# Patient Record
Sex: FEMALE | Race: WHITE | NOT HISPANIC OR LATINO | Employment: FULL TIME | URBAN - METROPOLITAN AREA
[De-identification: names, ages, dates, MRNs, and addresses within clinical notes are randomized per-mention and may not be internally consistent; named-entity substitution may affect disease eponyms.]

---

## 2017-03-30 ENCOUNTER — HOSPITAL ENCOUNTER (EMERGENCY)
Facility: OTHER | Age: 37
Discharge: HOME | End: 2017-03-30
Attending: INTERNAL MEDICINE
Payer: MEDICAID

## 2017-03-30 VITALS
SYSTOLIC BLOOD PRESSURE: 138 MMHG | DIASTOLIC BLOOD PRESSURE: 86 MMHG | HEART RATE: 78 BPM | TEMPERATURE: 98 F | BODY MASS INDEX: 33.75 KG/M2 | HEIGHT: 66 IN | OXYGEN SATURATION: 98 % | RESPIRATION RATE: 18 BRPM | WEIGHT: 210 LBS

## 2017-03-30 DIAGNOSIS — L03.221 CELLULITIS OF NECK: Primary | ICD-10-CM

## 2017-03-30 PROCEDURE — 99283 EMERGENCY DEPT VISIT LOW MDM: CPT

## 2017-03-30 RX ORDER — IBUPROFEN 800 MG/1
800 TABLET ORAL EVERY 8 HOURS PRN
Qty: 20 TABLET | Refills: 0 | OUTPATIENT
Start: 2017-03-30 | End: 2018-08-16

## 2017-03-30 RX ORDER — DOXYCYCLINE 100 MG/1
100 CAPSULE ORAL 2 TIMES DAILY
Qty: 20 CAPSULE | Refills: 0 | Status: SHIPPED | OUTPATIENT
Start: 2017-03-30 | End: 2017-04-09

## 2017-03-30 NOTE — DISCHARGE INSTRUCTIONS
Discharge Instructions for Cellulitis  You have been diagnosed with cellulitis. This is an infection in the deepest layer of the skin. In some cases, the infection also affects the muscle. Cellulitis is caused by bacteria. The bacteria can enter the body through broken skin. This can happen with a cut, scratch, animal bite, or an insect bite that has been scratched. You may have been treated in the hospital with antibiotics and fluids. You will likely be given a prescription for antibiotics to take at home. This sheet will help you take care of yourself at home.  Home care  When you are home:  · Take the prescribed antibiotic medicine you are given as directed until it is gone. Take it even if you feel better. It treats the infection and stops it from returning. Not taking all the medicine can make future infections hard to treat.  · Keep the infected area clean.  · When possible, raise the infected area above the level of your heart. This helps keep swelling down.  · Talk with your healthcare provider if you are in pain. Ask what kind of over-the-counter medicine you can take for pain.  · Apply clean bandages as advised.  · Take your temperature once a day for a week.  · Wash your hands often to prevent spreading the infection.  In the future, wash your hands before and after you touch cuts, scratches, or bandages. This will help prevent infection.   When to call your healthcare provider  Call your healthcare provider immediately if you have any of the following:  · Difficulty or pain when moving the joints above or below the infected area  · Discharge or pus draining from the area  · Fever of 100.4°F (38°C) or higher, or as directed by your healthcare provider  · Pain that gets worse in or around the infected   · Redness that gets worse in or around the infected area, particularly if the area of redness expands to a wider area  · Shaking chills  · Swelling of the infected area  · Vomiting   Date Last Reviewed:  8/1/2016  © 2938-2164 [employer]. [address] [place] [address], [place] [address]. All rights reserved. This information is not intended as a substitute for professional medical care. Always follow your healthcare professional's instructions.

## 2017-03-30 NOTE — ED PROVIDER NOTES
Encounter Date: 3/30/2017       History     Chief Complaint   Patient presents with    Abscess     pt here with c/o swelling/redness of left side of neck     Review of patient's allergies indicates:  No Known Allergies  Patient is a 36 y.o. female presenting with the following complaint: rash. The history is provided by the patient. No  was used.   Rash    This is a new problem. The current episode started just prior to arrival. The problem has been unchanged. The problem is associated with an insect bite/sting. The rash is present on the neck (left). The pain is at a severity of 0/10. Associated symptoms include itching and pain. She has tried nothing for the symptoms.     History reviewed. No pertinent past medical history.  Past Surgical History:   Procedure Laterality Date     SECTION      x2    TUBAL LIGATION       Family History   Problem Relation Age of Onset    No Known Problems Mother     Hypertension Father     No Known Problems Sister     No Known Problems Brother      Social History   Substance Use Topics    Smoking status: Former Smoker     Types: Cigarettes    Smokeless tobacco: Never Used    Alcohol use Yes      Comment: occassionally     Review of Systems   Constitutional: Negative.    HENT: Negative.    Eyes: Negative.    Respiratory: Negative.    Cardiovascular: Negative.    Gastrointestinal: Negative.    Musculoskeletal: Negative.    Skin: Positive for color change, itching and rash. Negative for pallor and wound.   Neurological: Negative.    Hematological: Negative.    Psychiatric/Behavioral: Negative.        Physical Exam   Initial Vitals   BP Pulse Resp Temp SpO2   17 1451 17 1451 17 1451 17 1451 17 1451   138/86 78 18 97.7 °F (36.5 °C) 98 %     Physical Exam    Nursing note and vitals reviewed.  Constitutional: She appears well-developed and well-nourished.   HENT:   Head: Normocephalic.   Eyes: EOM are normal.   Neck: Normal  range of motion.   Cardiovascular: Normal rate and regular rhythm.   Pulmonary/Chest: Breath sounds normal.   Abdominal: Soft. Bowel sounds are normal.   Musculoskeletal: Normal range of motion.   Neurological: She is alert and oriented to person, place, and time.   Skin: Skin is warm and dry. No abscess noted. There is erythema. No pallor.   approx 0.5 cm raised area of induration surrounded by erythema for total of approx 3cm diameter area of erythema; slight tenderness to palpation; non-fluctuant         ED Course   Procedures  Labs Reviewed - No data to display                            ED Course     Labs Reviewed  No results found for any previous visit.     Imaging Reviewed    Imaging Results     None          Medications given in ED    Medications - No data to display    Discharge Medications     Medication List with Changes/Refills   New Medications    DOXYCYCLINE (VIBRAMYCIN) 100 MG CAP    Take 1 capsule (100 mg total) by mouth 2 (two) times daily.    IBUPROFEN (ADVIL,MOTRIN) 800 MG TABLET    Take 1 tablet (800 mg total) by mouth every 8 (eight) hours as needed for Pain.   Current Medications    BUPROPION (WELLBUTRIN XL) 150 MG TB24 TABLET    Take 150 mg by mouth once daily.             Patient discharged to home in stable condition with instructions to:   1. Please take all meds as prescribed.  2. Follow-up with your primary care doctor   3. Return precautions discussed and patient and/or family/caretaker understands to return to the emergency room for any concerns including worsening of your current symptoms, fever, chills, night sweats, worsening pain, chest pain, shortness of breath, nausea, vomiting, diarrhea, bleeding, headache, difficulty talking, visual disturbances, weakness, numbness or any other acute concerns    Clinical Impression:   Cellulitis of neck  Disposition:   Disposition: Discharged  Condition: Stable       Tal Borjas MD  03/30/17 4988

## 2017-03-30 NOTE — ED AVS SNAPSHOT
[facility] EMERGENCY DEPARTMENT  [address] [place] [address]               Kwasi Arias   3/30/2017  2:50 PM   ED    Description:  Female : 1980   Department:  [facility] Emergency Department           Your Care was Coordinated By:     Provider Role From To    Tal Borjas MD Attending Provider 17 [id number] --      Reason for Visit     Abscess           Diagnoses this Visit        Comments    Cellulitis of neck    -  Primary       ED Disposition     ED Disposition Condition Comment    Discharge             To Do List           Follow-up Information     Follow up with [facility] In 1 day(s).       These Medications        Disp Refills Start End    doxycycline (VIBRAMYCIN) 100 MG Cap 20 capsule 0 3/30/2017 2017    Take 1 capsule (100 mg total) by mouth 2 (two) times daily. - Oral    ibuprofen (ADVIL,MOTRIN) 800 MG tablet 20 tablet 0 3/30/2017     Take 1 tablet (800 mg total) by mouth every 8 (eight) hours as needed for Pain. - Oral      [facility] On Call     [facility] On Call Nurse Care Line -  Assistance  Unless otherwise directed by your provider, please contact [facility] On-Call, our nurse care line that is available for  assistance.     Registered nurses in the [facility] On Call Center provide: appointment scheduling, clinical advisement, health education, and other advisory services.  Call: [phone] (toll free)               Medications           Message regarding Medications     Verify the changes and/or additions to your medication regime listed below are the same as discussed with your clinician today.  If any of these changes or additions are incorrect, please notify your healthcare provider.        START taking these NEW medications        Refills    doxycycline (VIBRAMYCIN) 100 MG Cap 0    Sig: Take 1 capsule (100 mg total) by mouth 2 (two) times daily.    Class: Print    Route: Oral    ibuprofen (ADVIL,MOTRIN) 800 MG  "tablet 0    Sig: Take 1 tablet (800 mg total) by mouth every 8 (eight) hours as needed for Pain.    Class: Print    Route: Oral           Verify that the below list of medications is an accurate representation of the medications you are currently taking.  If none reported, the list may be blank. If incorrect, please contact your healthcare provider. Carry this list with you in case of emergency.           Current Medications     buPROPion (WELLBUTRIN XL) 150 MG TB24 tablet Take 150 mg by mouth once daily.    doxycycline (VIBRAMYCIN) 100 MG Cap Take 1 capsule (100 mg total) by mouth 2 (two) times daily.    ibuprofen (ADVIL,MOTRIN) 800 MG tablet Take 1 tablet (800 mg total) by mouth every 8 (eight) hours as needed for Pain.           Clinical Reference Information           Your Vitals Were     BP Pulse Temp Resp Height Weight    138/86 78 97.7 °F (36.5 °C) (Temporal) 18 5' 6" (1.676 m) 95.3 kg (210 lb)    Last Period SpO2 BMI          03/29/2017 98% 33.89 kg/m2        Allergies as of 3/30/2017     No Known Allergies      Immunizations Administered on Date of Encounter - 3/30/2017     None      ED Micro, Lab, POCT     None      ED Imaging Orders     None        Discharge Instructions           Discharge Instructions for Cellulitis  You have been diagnosed with cellulitis. This is an infection in the deepest layer of the skin. In some cases, the infection also affects the muscle. Cellulitis is caused by bacteria. The bacteria can enter the body through broken skin. This can happen with a cut, scratch, animal bite, or an insect bite that has been scratched. You may have been treated in the hospital with antibiotics and fluids. You will likely be given a prescription for antibiotics to take at home. This sheet will help you take care of yourself at home.  Home care  When you are home:  · Take the prescribed antibiotic medicine you are given as directed until it is gone. Take it even if you feel better. It treats the " infection and stops it from returning. Not taking all the medicine can make future infections hard to treat.  · Keep the infected area clean.  · When possible, raise the infected area above the level of your heart. This helps keep swelling down.  · Talk with your healthcare provider if you are in pain. Ask what kind of over-the-counter medicine you can take for pain.  · Apply clean bandages as advised.  · Take your temperature once a day for a week.  · Wash your hands often to prevent spreading the infection.  In the future, wash your hands before and after you touch cuts, scratches, or bandages. This will help prevent infection.   When to call your healthcare provider  Call your healthcare provider immediately if you have any of the following:  · Difficulty or pain when moving the joints above or below the infected area  · Discharge or pus draining from the area  · Fever of 100.4°F (38°C) or higher, or as directed by your healthcare provider  · Pain that gets worse in or around the infected   · Redness that gets worse in or around the infected area, particularly if the area of redness expands to a wider area  · Shaking chills  · Swelling of the infected area  · Vomiting   Date Last Reviewed: 8/1/2016  © 9872-4459 [employer]. [address] [place] [address]. All rights reserved. This information is not intended as a substitute for professional medical care. Always follow your healthcare professional's instructions.          [facility] Sign-Up     Activating your [facility] account is as easy as 1-2-3!     1) Visit [email or web address], select Sign Up Now, enter this activation code and your date of birth, then select Next.  [email or web address]  Expires: 5/14/2017  3:02 PM      2) Create a username and password to use when you visit [facility] in the future and select a security question in case you lose your password and select Next.    3) Enter your e-mail address and click Sign Up!    Additional  Information  If you have questions, please e-mail [email or web address] or call [phone] to talk to our [employer] staff. Remember, [facility] is NOT to be used for urgent needs. For medical emergencies, dial 911.         Smoking Cessation     If you would like to quit smoking:   You may be eligible for free services if you are a [place] resident and started smoking cigarettes before September 1, 1988.  Call the Smoking Cessation Trust ([facility]) toll free at (411) 651-6258 or ([phone] [phone].   Call 1-800-QUIT-NOW if you do not meet the above criteria.   Contact us via email: [email or web address]   View our website for more information: [email or web address]         [facility] complies with applicable Federal civil rights laws and does not discriminate on the basis of race, color, national origin, age, disability, or sex.        Language Assistance Services     ATTENTION: Language assistance services are available, free of charge. Please call [phone].      ATENCIÓN: Si habla español, tiene a troncoso disposición servicios gratuitos de asistencia lingüística. Llame al [phone].     CHÚ Ý: N?u b?n nói Ti?ng Vi?t, có các d?ch v? h? tr? ngôn ng? mi?n phí dành cho b?n. G?i s? [phone].

## 2017-12-23 ENCOUNTER — HOSPITAL ENCOUNTER (EMERGENCY)
Facility: OTHER | Age: 37
Discharge: HOME | End: 2017-12-23
Attending: EMERGENCY MEDICINE
Payer: MEDICAID

## 2017-12-23 VITALS
HEIGHT: 66 IN | HEART RATE: 91 BPM | TEMPERATURE: 98 F | SYSTOLIC BLOOD PRESSURE: 134 MMHG | RESPIRATION RATE: 16 BRPM | DIASTOLIC BLOOD PRESSURE: 74 MMHG | WEIGHT: 200 LBS | BODY MASS INDEX: 32.14 KG/M2 | OXYGEN SATURATION: 100 %

## 2017-12-23 DIAGNOSIS — J40 BRONCHITIS: Primary | ICD-10-CM

## 2017-12-23 DIAGNOSIS — F17.200 TOBACCO DEPENDENCE: ICD-10-CM

## 2017-12-23 LAB
B-HCG UR QL: NEGATIVE
CTP QC/QA: YES

## 2017-12-23 PROCEDURE — 81025 URINE PREGNANCY TEST: CPT | Performed by: EMERGENCY MEDICINE

## 2017-12-23 PROCEDURE — 99283 EMERGENCY DEPT VISIT LOW MDM: CPT

## 2017-12-23 RX ORDER — BENZONATATE 100 MG/1
100 CAPSULE ORAL 3 TIMES DAILY PRN
Qty: 20 CAPSULE | Refills: 0 | Status: SHIPPED | OUTPATIENT
Start: 2017-12-23 | End: 2018-01-02

## 2017-12-23 RX ORDER — AZITHROMYCIN 250 MG/1
TABLET, FILM COATED ORAL
Qty: 6 TABLET | Refills: 0 | Status: ON HOLD | OUTPATIENT
Start: 2017-12-23 | End: 2019-08-12 | Stop reason: HOSPADM

## 2017-12-23 NOTE — ED PROVIDER NOTES
Encounter Date: 2017       History     Chief Complaint   Patient presents with    Cough     non-productive cough x2 weeks     Chief complaint: Cough  37-year-old smoker presents with a cough for the last 2 weeks.  She has occasional green sputum production.  Patient's been taking Tylenol cold and flu without improvement.  She has shortness of breath occasionally.  She denies other symptoms such as chest pain, fever, sore throat or nasal congestion.      The history is provided by the patient.     Review of patient's allergies indicates:  No Known Allergies  History reviewed. No pertinent past medical history.  Past Surgical History:   Procedure Laterality Date     SECTION      x2    TUBAL LIGATION       Family History   Problem Relation Age of Onset    No Known Problems Mother     Hypertension Father     No Known Problems Sister     No Known Problems Brother      Social History   Substance Use Topics    Smoking status: Current Every Day Smoker     Types: Cigarettes    Smokeless tobacco: Never Used    Alcohol use Yes      Comment: occassionally     Review of Systems   Constitutional: Negative for fever.   HENT: Negative for congestion and sore throat.    Eyes: Negative for redness.   Respiratory: Positive for cough and shortness of breath (occasional).    Cardiovascular: Negative for chest pain and leg swelling.   Gastrointestinal: Negative for nausea.   Genitourinary: Negative for dysuria.   Musculoskeletal: Negative for back pain.   Skin: Negative for rash.   Neurological: Negative for weakness.       Physical Exam     Initial Vitals [17 1202]   BP Pulse Resp Temp SpO2   134/74 91 16 98.2 °F (36.8 °C) 100 %      MAP       94         Physical Exam    Nursing note and vitals reviewed.  Constitutional: She appears well-developed and well-nourished.   HENT:   Head: Normocephalic and atraumatic.   Eyes: Conjunctivae and EOM are normal. Pupils are equal, round, and reactive to light.   Neck:  Normal range of motion. Neck supple.   Cardiovascular: Normal rate and regular rhythm.   Pulmonary/Chest: Breath sounds normal.   Abdominal: Soft. There is no tenderness. There is no rebound and no guarding.   Musculoskeletal: Normal range of motion.   Neurological: She is alert and oriented to person, place, and time. She has normal strength.   Skin: Skin is warm and dry.   Psychiatric: She has a normal mood and affect.         ED Course   Procedures  Labs Reviewed   POCT URINE PREGNANCY             Medical Decision Making:   Initial Assessment:   37-year-old who presents with cough for the last 2 weeks.  On exam patient is afebrile.  Lungs are clear and oxygen saturation is 100%.  ED Management:  Patient will be referred to a tobacco treatment program.  She'll be started on azithromycin and Tessalon Perles.                   ED Course      Clinical Impression:   The primary encounter diagnosis was Bronchitis. A diagnosis of Tobacco dependence was also pertinent to this visit.                           Clarice Galindo MD  12/23/17 6001

## 2018-01-02 ENCOUNTER — HOSPITAL ENCOUNTER (EMERGENCY)
Facility: OTHER | Age: 38
Discharge: HOME | End: 2018-01-02
Attending: EMERGENCY MEDICINE
Payer: MEDICAID

## 2018-01-02 VITALS
TEMPERATURE: 98 F | BODY MASS INDEX: 32.62 KG/M2 | OXYGEN SATURATION: 98 % | RESPIRATION RATE: 18 BRPM | DIASTOLIC BLOOD PRESSURE: 62 MMHG | HEIGHT: 66 IN | SYSTOLIC BLOOD PRESSURE: 164 MMHG | HEART RATE: 86 BPM | WEIGHT: 203 LBS

## 2018-01-02 DIAGNOSIS — J02.9 PHARYNGITIS, UNSPECIFIED ETIOLOGY: Primary | ICD-10-CM

## 2018-01-02 LAB
B-HCG UR QL: NEGATIVE
CTP QC/QA: YES
CTP QC/QA: YES
S PYO RRNA THROAT QL PROBE: NEGATIVE

## 2018-01-02 PROCEDURE — 63600175 PHARM REV CODE 636 W HCPCS: Performed by: EMERGENCY MEDICINE

## 2018-01-02 PROCEDURE — 81025 URINE PREGNANCY TEST: CPT | Performed by: EMERGENCY MEDICINE

## 2018-01-02 PROCEDURE — 87880 STREP A ASSAY W/OPTIC: CPT

## 2018-01-02 PROCEDURE — 99283 EMERGENCY DEPT VISIT LOW MDM: CPT | Mod: 25

## 2018-01-02 RX ORDER — IBUPROFEN 800 MG/1
800 TABLET ORAL EVERY 6 HOURS PRN
Qty: 20 TABLET | Refills: 0 | OUTPATIENT
Start: 2018-01-02 | End: 2018-08-16

## 2018-01-02 RX ORDER — DEXAMETHASONE SODIUM PHOSPHATE 4 MG/ML
8 INJECTION, SOLUTION INTRA-ARTICULAR; INTRALESIONAL; INTRAMUSCULAR; INTRAVENOUS; SOFT TISSUE
Status: COMPLETED | OUTPATIENT
Start: 2018-01-02 | End: 2018-01-02

## 2018-01-02 RX ADMIN — DEXAMETHASONE SODIUM PHOSPHATE 8 MG: 4 INJECTION, SOLUTION INTRAMUSCULAR; INTRAVENOUS at 03:01

## 2018-01-02 NOTE — ED PROVIDER NOTES
"Encounter Date: 2018       History     Chief Complaint   Patient presents with    Sore Throat     Pt states, " My throat has been hurting since yesterday, I think I have strep."     Chief complaint: I think I have strep throat  37-year-old complains of a sore throat for the last 2 days.  Patient says that she gets strep throat 2-3 times a year.  She reports difficulty swallowing.  She denies any other complaints.  No fever or URI symptoms.  Patient was seen here on  and treated with antibiotics for bronchitis.  She said those symptoms have resolved        The history is provided by the patient and medical records.     Review of patient's allergies indicates:  No Known Allergies  No past medical history on file.  Past Surgical History:   Procedure Laterality Date     SECTION      x2    TUBAL LIGATION       Family History   Problem Relation Age of Onset    No Known Problems Mother     Hypertension Father     No Known Problems Sister     No Known Problems Brother      Social History   Substance Use Topics    Smoking status: Current Every Day Smoker     Types: Cigarettes    Smokeless tobacco: Never Used    Alcohol use Yes      Comment: occassionally     Review of Systems   Constitutional: Negative for fever.   HENT: Positive for sore throat and trouble swallowing.    Respiratory: Negative for shortness of breath.    Gastrointestinal: Negative for abdominal pain.   Musculoskeletal: Negative for neck stiffness.   Neurological: Negative for weakness and headaches.       Physical Exam     Initial Vitals [18 1428]   BP Pulse Resp Temp SpO2   (!) 164/62 85 16 98.1 °F (36.7 °C) 100 %      MAP       96         Physical Exam    Nursing note and vitals reviewed.  Constitutional: No distress.   HENT:   Head: Atraumatic.   Right Ear: Tympanic membrane normal.   Left Ear: Tympanic membrane normal.   Mouth/Throat: Uvula is midline and oropharynx is clear and moist. No oropharyngeal exudate, " posterior oropharyngeal edema or posterior oropharyngeal erythema.   Eyes: Conjunctivae are normal.   Cardiovascular: Normal rate.   Pulmonary/Chest: Breath sounds normal.   Neurological: She is alert and oriented to person, place, and time.   Skin: Skin is warm and dry.   Psychiatric: She has a normal mood and affect.         ED Course   Procedures  Labs Reviewed   POCT URINE PREGNANCY   POCT RAPID STREP A             Medical Decision Making:   Initial Assessment:   37-year-old who complains of sore throat.  On exam patient is in no acute distress.  She does not have exudate to her posterior pharynx.  Neck is supple  ED Management:  Patient was negative for strep.  She was given Decadron in the ED and will be discharged on ibuprofen.                   ED Course      Clinical Impression:   The encounter diagnosis was Pharyngitis, unspecified etiology.                           Clarice Galindo MD  01/02/18 [id number]

## 2018-08-16 ENCOUNTER — HOSPITAL ENCOUNTER (EMERGENCY)
Facility: HOSPITAL | Age: 38
Discharge: HOME | End: 2018-08-16
Attending: EMERGENCY MEDICINE
Payer: MEDICAID

## 2018-08-16 VITALS
SYSTOLIC BLOOD PRESSURE: 136 MMHG | DIASTOLIC BLOOD PRESSURE: 81 MMHG | HEIGHT: 66 IN | BODY MASS INDEX: 31.34 KG/M2 | RESPIRATION RATE: 18 BRPM | TEMPERATURE: 100 F | WEIGHT: 195 LBS | OXYGEN SATURATION: 99 % | HEART RATE: 96 BPM

## 2018-08-16 DIAGNOSIS — J06.9 UPPER RESPIRATORY TRACT INFECTION, UNSPECIFIED TYPE: ICD-10-CM

## 2018-08-16 DIAGNOSIS — J02.0 STREP PHARYNGITIS: Primary | ICD-10-CM

## 2018-08-16 LAB
B-HCG UR QL: NEGATIVE
CTP QC/QA: YES
CTP QC/QA: YES
S PYO RRNA THROAT QL PROBE: POSITIVE

## 2018-08-16 PROCEDURE — 96372 THER/PROPH/DIAG INJ SC/IM: CPT

## 2018-08-16 PROCEDURE — 81025 URINE PREGNANCY TEST: CPT | Performed by: EMERGENCY MEDICINE

## 2018-08-16 PROCEDURE — 99283 EMERGENCY DEPT VISIT LOW MDM: CPT | Mod: 25

## 2018-08-16 PROCEDURE — 63600175 PHARM REV CODE 636 W HCPCS: Performed by: NURSE PRACTITIONER

## 2018-08-16 PROCEDURE — 87880 STREP A ASSAY W/OPTIC: CPT

## 2018-08-16 RX ORDER — BENZONATATE 100 MG/1
100 CAPSULE ORAL 3 TIMES DAILY PRN
Qty: 30 CAPSULE | Refills: 0 | Status: SHIPPED | OUTPATIENT
Start: 2018-08-16 | End: 2018-08-26

## 2018-08-16 RX ORDER — IBUPROFEN 600 MG/1
600 TABLET ORAL EVERY 6 HOURS PRN
Qty: 20 TABLET | Refills: 0 | Status: SHIPPED | OUTPATIENT
Start: 2018-08-16 | End: 2018-11-27 | Stop reason: SDUPTHER

## 2018-08-16 RX ORDER — DEXAMETHASONE SODIUM PHOSPHATE 4 MG/ML
10 INJECTION, SOLUTION INTRA-ARTICULAR; INTRALESIONAL; INTRAMUSCULAR; INTRAVENOUS; SOFT TISSUE
Status: COMPLETED | OUTPATIENT
Start: 2018-08-16 | End: 2018-08-16

## 2018-08-16 RX ADMIN — PENICILLIN G BENZATHINE 1.2 MILLION UNITS: 1200000 INJECTION, SUSPENSION INTRAMUSCULAR at 03:08

## 2018-08-16 RX ADMIN — DEXAMETHASONE SODIUM PHOSPHATE 10 MG: 4 INJECTION, SOLUTION INTRAMUSCULAR; INTRAVENOUS at 03:08

## 2018-08-16 NOTE — ED NOTES
Neuro: AAOx3  HEENT: Pt reports sore throat that began last night. Pt speaks with hoarseness. Throat appears reddened.   Resp: Airway patent, respirations even/unlabored. No SOB noted.  Cardiac: Skin pink/warm/dry, pulses intact. Pt denies any chest pain  Abdomen: Soft, non-tender to palpation, denies N/V/D  : No signs or symptoms of urinary disturbances  Musculoskeletal: Moves all extremities equally, ROM intact  Skin: Skin is dry and intact. Reports chills.

## 2018-08-16 NOTE — ED PROVIDER NOTES
Encounter Date: 2018       History     Chief Complaint   Patient presents with    Sore Throat     sore throat x's 1 week, painful swallowing     A 37-year-old female who presents to the ED with complaints of a sore throat x1 week.  She reports nasal congestion and cough.  Patient reports pain with swallowing.  Patient denies fever or chills. Patient has no significant medical problems but states she gets strep every year.      The history is provided by the patient.   Sore Throat    This is a new problem. The current episode started several weeks ago. The problem has been gradually worsening. There has been no fever. Associated symptoms include congestion and coughing. Pertinent negatives include no abdominal pain, neck pain, shortness of breath or vomiting. The treatment provided no relief.     Review of patient's allergies indicates:  No Known Allergies  History reviewed. No pertinent past medical history.  Past Surgical History:   Procedure Laterality Date     SECTION      x2    TUBAL LIGATION       Family History   Problem Relation Age of Onset    No Known Problems Mother     Hypertension Father     No Known Problems Sister     No Known Problems Brother      Social History     Tobacco Use    Smoking status: Current Every Day Smoker     Types: Cigarettes    Smokeless tobacco: Never Used   Substance Use Topics    Alcohol use: Yes     Comment: occassionally    Drug use: No     Review of Systems   Constitutional: Negative.  Negative for chills and fever.   HENT: Positive for congestion and sore throat.    Eyes: Negative.    Respiratory: Positive for cough. Negative for chest tightness and shortness of breath.    Cardiovascular: Negative.  Negative for chest pain.   Gastrointestinal: Negative.  Negative for abdominal pain and vomiting.   Endocrine: Negative.    Genitourinary: Negative.  Negative for dysuria and hematuria.   Musculoskeletal: Negative.  Negative for back pain and neck pain.    Skin: Negative.  Negative for rash.   Allergic/Immunologic: Negative for immunocompromised state.   Neurological: Negative.  Negative for weakness.   Hematological: Does not bruise/bleed easily.   Psychiatric/Behavioral: Negative.    All other systems reviewed and are negative.      Physical Exam     Initial Vitals [08/16/18 1502]   BP Pulse Resp Temp SpO2   136/81 96 18 99.6 °F (37.6 °C) 99 %      MAP       --         Physical Exam    Nursing note and vitals reviewed.  Constitutional: Vital signs are normal. She appears well-developed. She is cooperative. She does not appear ill.   HENT:   Head: Normocephalic and atraumatic.   Right Ear: External ear normal.   Left Ear: External ear normal.   Nose: Nose normal.   Mouth/Throat: Uvula is midline and mucous membranes are normal. Posterior oropharyngeal erythema present.   Eyes: Conjunctivae and lids are normal. Pupils are equal, round, and reactive to light.   Neck: Normal range of motion. Neck supple.   Cardiovascular: Normal rate, regular rhythm, S1 normal, S2 normal and normal heart sounds.   Pulmonary/Chest: Effort normal and breath sounds normal.   Abdominal: Soft. Normal appearance and bowel sounds are normal. There is no tenderness.   Musculoskeletal: Normal range of motion.   From of all extremities   Lymphadenopathy:     She has cervical adenopathy.        Right cervical: Superficial cervical adenopathy present.        Left cervical: Superficial cervical adenopathy present.   Neurological: She is alert and oriented to person, place, and time.   Skin: Skin is warm, dry and intact.   Psychiatric: She has a normal mood and affect. Her speech is normal. Thought content normal. Cognition and memory are normal.         ED Course   Procedures  Labs Reviewed   POCT RAPID STREP A - Abnormal; Notable for the following components:       Result Value    Rapid Strep A Screen Positive (*)     All other components within normal limits   POCT URINE PREGNANCY           Imaging Results    None          Medical Decision Making:   Initial Assessment:   A 37-year-old female who presents to the ED with complaints of the sore throat for 1 week.  Patient denies fever or chills.  Patient reports nasal congestion and cough.  The  Differential Diagnosis:   Viral pharyngitis  Strep pharyngitis  Clinical Tests:   Lab Tests: Ordered and Reviewed  ED Management:  Physical exam.  Straps swab positive.  Bicillin LA 1.2 IM.  Decadron 10 mg IM.   Discharge home with Tessalon Perles and Motrin p.r.n..  Follow-up with PCP in 1-2 days.  Return ED for worsening of symptoms.                      Clinical Impression:   The primary encounter diagnosis was Strep pharyngitis. A diagnosis of Upper respiratory tract infection, unspecified type was also pertinent to this visit.                             Viviane Barlow, LUIS ARMANDO  08/16/18 1536

## 2018-11-27 ENCOUNTER — HOSPITAL ENCOUNTER (EMERGENCY)
Facility: HOSPITAL | Age: 38
Discharge: HOME | End: 2018-11-27
Attending: EMERGENCY MEDICINE
Payer: MEDICAID

## 2018-11-27 VITALS
HEART RATE: 89 BPM | RESPIRATION RATE: 19 BRPM | BODY MASS INDEX: 31.66 KG/M2 | SYSTOLIC BLOOD PRESSURE: 148 MMHG | DIASTOLIC BLOOD PRESSURE: 85 MMHG | OXYGEN SATURATION: 100 % | TEMPERATURE: 98 F | HEIGHT: 66 IN | WEIGHT: 197 LBS

## 2018-11-27 DIAGNOSIS — M65.4 DE QUERVAIN'S TENOSYNOVITIS, LEFT: Primary | ICD-10-CM

## 2018-11-27 DIAGNOSIS — M25.532 LEFT WRIST PAIN: ICD-10-CM

## 2018-11-27 LAB
B-HCG UR QL: NEGATIVE
CTP QC/QA: YES

## 2018-11-27 PROCEDURE — 81025 URINE PREGNANCY TEST: CPT | Performed by: EMERGENCY MEDICINE

## 2018-11-27 PROCEDURE — 99283 EMERGENCY DEPT VISIT LOW MDM: CPT | Mod: 25

## 2018-11-27 PROCEDURE — 29125 APPL SHORT ARM SPLINT STATIC: CPT | Mod: LT

## 2018-11-27 RX ORDER — IBUPROFEN 600 MG/1
600 TABLET ORAL EVERY 6 HOURS PRN
Qty: 20 TABLET | Refills: 0 | Status: ON HOLD | OUTPATIENT
Start: 2018-11-27 | End: 2019-08-12 | Stop reason: HOSPADM

## 2018-11-27 NOTE — ED PROVIDER NOTES
Encounter Date: 2018       History     Chief Complaint   Patient presents with    Wrist Pain     pt reports she injured her left wrist about 2 months ago and she had been wearing a brace that she brought from a store. Pt reports last week she was at work, when she heard a crack in her wrist and has been having pain on and off since. pt reports she has been taking ibuprofen for pain but has had no relief     The history is provided by the patient. No  was used.   Hand Injury    Illness onset: Several weeks ago. The incident occurred in the street. The injury mechanism was a fall. Pain location: Left wrist. The pain is at a severity of 3/10. Pain course: Waxing and waning. Pertinent negatives include no fever. She reports no foreign bodies present. The symptoms are aggravated by movement. Treatments tried: Wrist brace.     Review of patient's allergies indicates:  No Known Allergies  History reviewed. No pertinent past medical history.  Past Surgical History:   Procedure Laterality Date     SECTION      x2    TUBAL LIGATION       Family History   Problem Relation Age of Onset    No Known Problems Mother     Hypertension Father     No Known Problems Sister     No Known Problems Brother      Social History     Tobacco Use    Smoking status: Current Every Day Smoker     Types: Cigarettes    Smokeless tobacco: Never Used   Substance Use Topics    Alcohol use: Yes     Comment: occassionally    Drug use: No     Review of Systems   Constitutional: Negative.  Negative for appetite change and fever.   HENT: Negative.  Negative for congestion, dental problem, ear discharge, hearing loss, mouth sores, rhinorrhea and trouble swallowing.    Eyes: Negative.  Negative for pain and discharge.   Respiratory: Negative.  Negative for shortness of breath.    Cardiovascular: Negative.  Negative for chest pain.   Gastrointestinal: Negative.  Negative for abdominal distention, abdominal pain,  constipation, diarrhea, nausea, rectal pain and vomiting.   Endocrine: Negative.    Genitourinary: Negative.  Negative for dyspareunia, dysuria, hematuria, vaginal bleeding, vaginal discharge and vaginal pain.   Musculoskeletal: Negative for back pain and neck pain.        Left wrist pain   Skin: Negative.  Negative for rash.   Allergic/Immunologic: Negative.    Neurological: Negative.  Negative for facial asymmetry, speech difficulty and light-headedness.   Hematological: Negative.    Psychiatric/Behavioral: Negative.  Negative for agitation, dysphoric mood and sleep disturbance.   All other systems reviewed and are negative.      Physical Exam     Initial Vitals [11/27/18 1507]   BP Pulse Resp Temp SpO2   (!) 148/85 89 19 98.3 °F (36.8 °C) 100 %      MAP       --         Physical Exam    Nursing note and vitals reviewed.  Constitutional: She appears well-developed and well-nourished. She is not diaphoretic.  Non-toxic appearance. She does not appear ill. No distress.   HENT:   Head: Normocephalic and atraumatic.   Eyes: Conjunctivae are normal. Right eye exhibits no discharge. Left eye exhibits no discharge.   Neck: Normal range of motion.   Cardiovascular: Normal rate, regular rhythm, normal heart sounds and intact distal pulses. Exam reveals no gallop and no friction rub.    No murmur heard.  Pulmonary/Chest: Breath sounds normal. No respiratory distress. She has no wheezes. She has no rhonchi. She has no rales. She exhibits no tenderness.   Musculoskeletal: Normal range of motion.        Left wrist: She exhibits tenderness and bony tenderness. She exhibits normal range of motion, no swelling, no effusion, no crepitus, no deformity and no laceration.   Negative scaphoid tenderness  Positive Finkelstein test   Neurological: She is alert and oriented to person, place, and time.   Skin: Skin is warm and dry. No rash noted.   Psychiatric: She has a normal mood and affect. Her behavior is normal. Judgment and thought  content normal.         ED Course   Splint Application  Date/Time: 11/27/2018 3:38 PM  Performed by: Toussaint Battley III, FNP  Authorized by: Laura Osorio DO   Consent Done: Emergent Situation  Location details: left wrist  Splint type: thumb spica  Supplies used: Velcro.  Post-procedure: The splinted body part was neurovascularly unchanged following the procedure.  Patient tolerance: Patient tolerated the procedure well with no immediate complications        Labs Reviewed   POCT URINE PREGNANCY          Imaging Results          X-Ray Wrist Complete Left (Final result)  Result time 11/27/18 15:30:17    Final result by Waldemar Lyn MD (11/27/18 15:30:17)                 Impression:      1. Focus of cortical irregularity, involving the distal radius as described above, may reflect irregularity related to physeal fusion although correlation with any focal tenderness in the region is advised.      Electronically signed by: Waldemar Lyn MD  Date:    11/27/2018  Time:    15:30             Narrative:    EXAMINATION:  XR WRIST COMPLETE 3 VIEWS LEFT    CLINICAL HISTORY:  Pain in left wrist    TECHNIQUE:  PA, lateral, and oblique views of the left wrist were performed.    COMPARISON:  None    FINDINGS:  Three views.    There is osseous irregularity along the thenar aspect of the distal radius, projecting somewhat posteriorly on lateral view.  This may reflect undulation related to irregular fusion of the physis although correlation with any focal tenderness in the region is recommended.  No dislocation..                                 Medical Decision Making:   Initial Assessment:   De Quervain's tenosynovitis  Differential Diagnosis:   Carpal tunnel, fracture/dislocation  Clinical Tests:   Radiological Study: Ordered and Reviewed  ED Management:  The patient is instructed to stop using a wrist brace and to begin using the thumb spica.  The patient will be discharged home with instructions to continue to take  over-the-counter NSAIDs, use wrist brace as needed, follow up with Dr. Mario Sprague, Orthopedic, for follow-up within 1 week due to irregularity noted on x-ray, and return to the ER as needed if symptoms worsen or fail to improve.  The patient verbalized understanding of discharge instructions and treatment plan.                      Clinical Impression:   The primary encounter diagnosis was De Quervain's tenosynovitis, left. A diagnosis of Left wrist pain was also pertinent to this visit.                             Toussaint Battley III, [facility]  11/27/18 1538

## 2018-11-28 ENCOUNTER — PES CALL (OUTPATIENT)
Dept: ADMINISTRATIVE | Facility: CLINIC | Age: 38
End: 2018-11-28

## 2019-05-31 ENCOUNTER — HOSPITAL ENCOUNTER (EMERGENCY)
Facility: HOSPITAL | Age: 39
Discharge: HOME | End: 2019-05-31
Attending: EMERGENCY MEDICINE
Payer: MEDICAID

## 2019-05-31 VITALS
SYSTOLIC BLOOD PRESSURE: 130 MMHG | DIASTOLIC BLOOD PRESSURE: 75 MMHG | BODY MASS INDEX: 31.34 KG/M2 | HEART RATE: 83 BPM | OXYGEN SATURATION: 99 % | RESPIRATION RATE: 16 BRPM | HEIGHT: 66 IN | WEIGHT: 195 LBS | TEMPERATURE: 98 F

## 2019-05-31 DIAGNOSIS — K04.7 DENTAL INFECTION: Primary | ICD-10-CM

## 2019-05-31 DIAGNOSIS — R22.0 LEFT FACIAL SWELLING: ICD-10-CM

## 2019-05-31 LAB
B-HCG UR QL: NEGATIVE
CTP QC/QA: YES

## 2019-05-31 PROCEDURE — 81025 URINE PREGNANCY TEST: CPT | Mod: ER | Performed by: EMERGENCY MEDICINE

## 2019-05-31 PROCEDURE — 25000003 PHARM REV CODE 250: Mod: ER | Performed by: NURSE PRACTITIONER

## 2019-05-31 PROCEDURE — 99284 EMERGENCY DEPT VISIT MOD MDM: CPT | Mod: 25,ER

## 2019-05-31 RX ORDER — AMOXICILLIN AND CLAVULANATE POTASSIUM 875; 125 MG/1; MG/1
1 TABLET, FILM COATED ORAL
Status: COMPLETED | OUTPATIENT
Start: 2019-05-31 | End: 2019-05-31

## 2019-05-31 RX ORDER — LORATADINE 10 MG/1
10 TABLET ORAL DAILY
Qty: 30 TABLET | Refills: 0 | Status: ON HOLD | OUTPATIENT
Start: 2019-05-31 | End: 2019-08-12 | Stop reason: HOSPADM

## 2019-05-31 RX ORDER — IBUPROFEN 600 MG/1
600 TABLET ORAL
Status: COMPLETED | OUTPATIENT
Start: 2019-05-31 | End: 2019-05-31

## 2019-05-31 RX ORDER — IBUPROFEN 600 MG/1
600 TABLET ORAL EVERY 6 HOURS PRN
Qty: 20 TABLET | Refills: 0 | Status: SHIPPED | OUTPATIENT
Start: 2019-05-31 | End: 2019-06-05

## 2019-05-31 RX ORDER — AMOXICILLIN AND CLAVULANATE POTASSIUM 875; 125 MG/1; MG/1
1 TABLET, FILM COATED ORAL 2 TIMES DAILY
Qty: 14 TABLET | Refills: 0 | Status: SHIPPED | OUTPATIENT
Start: 2019-05-31 | End: 2019-06-07

## 2019-05-31 RX ADMIN — AMOXICILLIN AND CLAVULANATE POTASSIUM 1 TABLET: 875; 125 TABLET, FILM COATED ORAL at 11:05

## 2019-05-31 RX ADMIN — IBUPROFEN 600 MG: 600 TABLET ORAL at 11:05

## 2019-05-31 NOTE — DISCHARGE INSTRUCTIONS
We have prescribed antibiotics for your facial swelling and dental pain.  We have also prescribed to ibuprofen and Claritin for sinus congestion.  Please follow up with your PCP for symptoms do not resolve in the next 7-10 days.    Our goal in the emergency department is to always give you outstanding care and exceptional service. You may receive a survey by mail or e-mail in the next week regarding your experience in our ED. We would greatly appreciate your completing and returning the survey. Your feedback provides us with a way to recognize our staff who give very good care and it helps us learn how to improve when your experience was below our aspiration of excellence.

## 2019-05-31 NOTE — ED PROVIDER NOTES
Encounter Date: 2019    SCRIBE #1 NOTE: I, Aliyah Carson, am scribing for, and in the presence of,  Lindsey Moss NP. I have scribed the following portions of the note - Other sections scribed: HPI, ROS, and PE.       History     Chief Complaint   Patient presents with    Facial Swelling     Swelling to left cheek upon waking this morning.  Pt took generic allergy/sinus medicine approx 2 hours PTA - reports some decreased in swelling after taking medicine.     Kwasi Arias is a 38 y.o. female who presents to the ED complaining of facial swelling that began this morning when she woke up. Pt endorses sinus pain. She states she took sinus medication 2.5 hours ago which somewhat alleviated symptoms. Pt states she chipped one of her teeth a couple months ago, but it has not been bothering her.    The history is provided by the patient. No  was used.     Review of patient's allergies indicates:  No Known Allergies  History reviewed. No pertinent past medical history.  Past Surgical History:   Procedure Laterality Date     SECTION      x2    TUBAL LIGATION       Family History   Problem Relation Age of Onset    No Known Problems Mother     Hypertension Father     No Known Problems Sister     No Known Problems Brother      Social History     Tobacco Use    Smoking status: Current Every Day Smoker     Types: Cigarettes    Smokeless tobacco: Never Used   Substance Use Topics    Alcohol use: Yes     Comment: occassionally    Drug use: No     Review of Systems   Constitutional: Negative for fever.   HENT: Positive for dental problem, facial swelling, postnasal drip, rhinorrhea and sinus pain. Negative for congestion, mouth sores, sinus pressure, sore throat, trouble swallowing and voice change.         Positive for chipped tooth.   Respiratory: Negative for shortness of breath.    Cardiovascular: Negative for chest pain.   Gastrointestinal: Negative for nausea.    Genitourinary: Negative for dysuria.   Musculoskeletal: Negative for back pain.   Skin: Negative for rash.   Neurological: Negative for weakness.   Hematological: Does not bruise/bleed easily.   All other systems reviewed and are negative.      Physical Exam     Initial Vitals [05/31/19 1123]   BP Pulse Resp Temp SpO2   130/75 83 16 98.3 °F (36.8 °C) 99 %      MAP       --         Physical Exam    Nursing note and vitals reviewed.  Constitutional: Vital signs are normal. She appears well-developed and well-nourished. She is cooperative. She does not have a sickly appearance. She does not appear ill. No distress.   HENT:   Head: Normocephalic and atraumatic. Head is without abrasion, without contusion and without laceration.       Nose: Mucosal edema present. No sinus tenderness, septal deviation or nasal septal hematoma.  No foreign bodies. Left sinus exhibits maxillary sinus tenderness.   Mouth/Throat: Uvula is midline, oropharynx is clear and moist and mucous membranes are normal.       Mild left facial swelling noted. Focal tenderness to palpation noted.   Eyes: Conjunctivae, EOM and lids are normal. Pupils are equal, round, and reactive to light.   Neck: Normal range of motion.   Cardiovascular: Normal rate, regular rhythm and normal heart sounds.   Pulmonary/Chest: Effort normal and breath sounds normal. No respiratory distress.   Musculoskeletal: Normal range of motion.   Neurological: She is alert and oriented to person, place, and time. GCS score is 15. GCS eye subscore is 4. GCS verbal subscore is 5. GCS motor subscore is 6.   Skin: Skin is warm, dry and intact. Capillary refill takes less than 2 seconds. No abrasion, no ecchymosis, no laceration and no rash noted. No erythema.         ED Course   Procedures  Labs Reviewed   POCT URINE PREGNANCY          Imaging Results    None          Medical Decision Making:   Initial Assessment:   Emergent evaluation of a 39 yo female patient presenting to the ER with  chief complaint of left facial swelling and tenderness this morning.  Patient states she took a sinus pill which resolved some of the tenderness and swelling. Patient denies any focal tooth pain, difficulty swallowing or difficulty breathing.  On exam patient is A&O x3. Patient is not febrile and nontoxic appearing.  Breath sounds clear bilaterally.  No stridor noted.  Tenderness to palpation to the left cheek.  Mild swelling noted. 1st molar has some redness at the root base.  No severe tenderness to palpation noted. No redness, erythema or warmth to face.  Mucous membranes pink and moist.  Oropharynx clear.    Differential Diagnosis:   Differential diagnoses include but are not limited to dental abscess, dental infection, sinusitis, cellulitis.      Clinical Tests:   Lab Tests: Ordered and Reviewed  The following lab test(s) were unremarkable: UPT       <> Summary of Lab: Urine preg negative.  ED Management:  I do not feel labs or imaging are pertinent for the care this patient.  I will medicate and discharge home.    Patient verbalized understanding of this plan of care.  All questions and concerns addressed.  Patient is hemodynamically stable, vital signs are normal. Discharge instructions given. Return to ED precautions discussed. Follow up as directed. Pt verbalized understanding of this plan.  Pt is stable for discharge.            Scribe Attestation:   Scribe #1: I performed the above scribed service and the documentation accurately describes the services I performed. I attest to the accuracy of the note.    I, Lindsey Moss, personally performed the services described in this documentation. All medical record entries made by the scribe were at my direction and in my presence.  I have reviewed the chart and agree that the record reflects my personal performance and is accurate and complete.     Lindsey Moss, SPENSERP, FNP    12:10 PM 05/31/2019             Clinical Impression:     1. Dental infection    2. Left  facial swelling            Disposition:   Disposition: Discharged  Condition: Stable                        Lindsey Moss NP  05/31/19 8578

## 2019-05-31 NOTE — ED NOTES
Patient stated she woke up this morning with left facial pain and left upper gum pain   Patient stated she cracked an upper left tooth a while back and hasn't been to dentist

## 2019-06-14 ENCOUNTER — HOSPITAL ENCOUNTER (EMERGENCY)
Facility: HOSPITAL | Age: 39
Discharge: HOME | End: 2019-06-14
Attending: EMERGENCY MEDICINE
Payer: MEDICAID

## 2019-06-14 VITALS
OXYGEN SATURATION: 99 % | SYSTOLIC BLOOD PRESSURE: 127 MMHG | HEART RATE: 88 BPM | TEMPERATURE: 98 F | RESPIRATION RATE: 16 BRPM | BODY MASS INDEX: 31.34 KG/M2 | WEIGHT: 195 LBS | DIASTOLIC BLOOD PRESSURE: 76 MMHG | HEIGHT: 66 IN

## 2019-06-14 DIAGNOSIS — K08.89 PAIN, DENTAL: Primary | ICD-10-CM

## 2019-06-14 LAB
B-HCG UR QL: NEGATIVE
CTP QC/QA: YES

## 2019-06-14 PROCEDURE — 81025 URINE PREGNANCY TEST: CPT | Mod: ER | Performed by: EMERGENCY MEDICINE

## 2019-06-14 PROCEDURE — 99283 EMERGENCY DEPT VISIT LOW MDM: CPT | Mod: ER

## 2019-06-14 RX ORDER — AMOXICILLIN AND CLAVULANATE POTASSIUM 875; 125 MG/1; MG/1
1 TABLET, FILM COATED ORAL 2 TIMES DAILY
Qty: 14 TABLET | Refills: 0 | Status: SHIPPED | OUTPATIENT
Start: 2019-06-14 | End: 2019-06-24

## 2019-06-14 NOTE — ED PROVIDER NOTES
Encounter Date: 2019    SCRIBE #1 NOTE: I, Hannah Jolly, am scribing for, and in the presence of,  Dr. Galindo. I have scribed the following portions of the note - Other sections scribed: HPI, ROS, PE.       History     Chief Complaint   Patient presents with    Dental Problem     Recurring dental abscess to left upper side of mouth.  Pt requesting additional antibiotics to last to her dental appointment on 19.     CC: dental pain  38 y.o female presents to the ED with left upper dental pain that feels like a pressure that started yesterday. She has a hx of a dental abscess and was treated with antibiotics but fears that the infection may have come back due to the intial infection starting out the same way. She has an appointment with a dentist on 19, but feels her symptoms may become worse before then. She denies fever, chills, or facial swelling.     The history is provided by the patient. No  was used.     Review of patient's allergies indicates:  No Known Allergies  History reviewed. No pertinent past medical history.  Past Surgical History:   Procedure Laterality Date     SECTION      x2    TUBAL LIGATION       Family History   Problem Relation Age of Onset    No Known Problems Mother     Hypertension Father     No Known Problems Sister     No Known Problems Brother      Social History     Tobacco Use    Smoking status: Current Every Day Smoker     Types: Cigarettes    Smokeless tobacco: Never Used   Substance Use Topics    Alcohol use: Yes     Comment: occassionally    Drug use: No     Review of Systems   Constitutional: Negative for chills and fever.   HENT: Positive for dental problem. Negative for facial swelling.        Physical Exam     Initial Vitals [19 1037]   BP Pulse Resp Temp SpO2   127/76 88 16 98.4 °F (36.9 °C) 99 %      MAP       --         Physical Exam    Nursing note and vitals reviewed.  Constitutional: She appears well-developed  and well-nourished. She is not diaphoretic. No distress.   HENT:   Head: Normocephalic and atraumatic.   Mouth/Throat: Uvula is midline, oropharynx is clear and moist and mucous membranes are normal. No oral lesions. No trismus in the jaw. No dental abscesses or uvula swelling. No oropharyngeal exudate, posterior oropharyngeal edema, posterior oropharyngeal erythema or tonsillar abscesses.       Eyes: EOM are normal.   Neck: Normal range of motion. Neck supple. Normal range of motion present.   Pulmonary/Chest: No respiratory distress.   Musculoskeletal: Normal range of motion.   Lymphadenopathy:        Head (right side): No submandibular adenopathy present.        Head (left side): No submandibular adenopathy present.   Neurological: She is alert and oriented to person, place, and time. No cranial nerve deficit or sensory deficit.   Skin: Skin is warm and dry. Capillary refill takes less than 2 seconds.   Psychiatric: She has a normal mood and affect. Her behavior is normal.         ED Course   Procedures  Labs Reviewed   POCT URINE PREGNANCY          Imaging Results    None          Medical Decision Making:   Initial Assessment:   38 y.o female presents to the ED with left upper dental pain since yesterday. Physical exam findings are significant for mild swelling above the 2nd left incisor. No abscess noted. No erythema to the gums. Oropharynx clear and moist. No trismus or drooling.   Clinical Tests:   Lab Tests: Ordered and Reviewed  ED Management:  I will order POCT UPT.  Patient will be given a prescription for Augmentin.            Scribe Attestation:   Scribe #1: I performed the above scribed service and the documentation accurately describes the services I performed. I attest to the accuracy of the note.    \   I, Dr. Clarice Galindo, personally performed the services described in this documentation. All medical record entries made by the scribe were at my direction and in my presence.  I have reviewed the  chart and agree that the record reflects my personal performance and is accurate and complete. Clarice Galindo MD.  11:28 AM 06/14/2019             Clinical Impression:     1. Pain, dental                                   Clarice Galindo MD  06/14/19 1122

## 2019-06-14 NOTE — PROGRESS NOTES
Pt requested diflucan after atb. Dr. persaud aware of the request. New order received. Prescription called in to the [facility] on [address] and [facility]. Pt was made aware.

## 2019-08-10 ENCOUNTER — HOSPITAL ENCOUNTER (INPATIENT)
Facility: HOSPITAL | Age: 39
LOS: 2 days | Discharge: HOME | DRG: 392 | End: 2019-08-12
Attending: INTERNAL MEDICINE | Admitting: INTERNAL MEDICINE
Payer: MEDICAID

## 2019-08-10 DIAGNOSIS — K57.92 ACUTE DIVERTICULITIS: ICD-10-CM

## 2019-08-10 DIAGNOSIS — K57.92 ACUTE DIVERTICULITIS: Primary | ICD-10-CM

## 2019-08-10 LAB
B-HCG UR QL: NEGATIVE
BILIRUBIN, POC UA: NEGATIVE
BLOOD, POC UA: NEGATIVE
CLARITY, POC UA: CLEAR
COLOR, POC UA: YELLOW
CTP QC/QA: YES
GLUCOSE, POC UA: NEGATIVE
KETONES, POC UA: NEGATIVE
LEUKOCYTE EST, POC UA: NEGATIVE
NITRITE, POC UA: NEGATIVE
PH UR STRIP: 6 [PH]
PROTEIN, POC UA: NEGATIVE
SPECIFIC GRAVITY, POC UA: 1.02
UROBILINOGEN, POC UA: 0.2 E.U./DL

## 2019-08-10 PROCEDURE — 99285 EMERGENCY DEPT VISIT HI MDM: CPT | Mod: 25,ER

## 2019-08-10 PROCEDURE — 81003 URINALYSIS AUTO W/O SCOPE: CPT | Mod: ER

## 2019-08-10 PROCEDURE — 81025 URINE PREGNANCY TEST: CPT | Mod: ER | Performed by: INTERNAL MEDICINE

## 2019-08-10 PROCEDURE — 21400001 HC TELEMETRY ROOM

## 2019-08-10 PROCEDURE — 25500020 PHARM REV CODE 255: Mod: ER | Performed by: INTERNAL MEDICINE

## 2019-08-10 PROCEDURE — 85025 COMPLETE CBC W/AUTO DIFF WBC: CPT | Mod: ER

## 2019-08-10 PROCEDURE — 96374 THER/PROPH/DIAG INJ IV PUSH: CPT | Mod: ER

## 2019-08-10 PROCEDURE — 80053 COMPREHEN METABOLIC PANEL: CPT | Mod: ER

## 2019-08-10 RX ADMIN — IOHEXOL 75 ML: 350 INJECTION, SOLUTION INTRAVENOUS at 11:08

## 2019-08-11 PROBLEM — D72.829 LEUKOCYTOSIS: Status: ACTIVE | Noted: 2019-08-11

## 2019-08-11 PROBLEM — D50.9 MICROCYTIC ANEMIA: Status: ACTIVE | Noted: 2019-08-11

## 2019-08-11 PROBLEM — L03.221 CELLULITIS OF NECK: Status: RESOLVED | Noted: 2017-03-30 | Resolved: 2019-08-11

## 2019-08-11 PROBLEM — K57.92 ACUTE DIVERTICULITIS: Status: ACTIVE | Noted: 2019-08-11

## 2019-08-11 PROBLEM — E66.9 OBESITY (BMI 30-39.9): Status: ACTIVE | Noted: 2019-08-11

## 2019-08-11 LAB
ALBUMIN SERPL BCP-MCNC: 3.5 G/DL (ref 3.5–5.2)
ALP SERPL-CCNC: 56 U/L (ref 55–135)
ALT SERPL W/O P-5'-P-CCNC: 29 U/L (ref 10–44)
ANION GAP SERPL CALC-SCNC: 8 MMOL/L (ref 8–16)
AST SERPL-CCNC: 22 U/L (ref 10–40)
BASOPHILS # BLD AUTO: 0.03 K/UL (ref 0–0.2)
BASOPHILS NFR BLD: 0.2 % (ref 0–1.9)
BILIRUB SERPL-MCNC: 0.5 MG/DL (ref 0.1–1)
BUN SERPL-MCNC: 12 MG/DL (ref 6–20)
CALCIUM SERPL-MCNC: 9.2 MG/DL (ref 8.7–10.5)
CHLORIDE SERPL-SCNC: 105 MMOL/L (ref 95–110)
CO2 SERPL-SCNC: 24 MMOL/L (ref 23–29)
CREAT SERPL-MCNC: 0.9 MG/DL (ref 0.5–1.4)
DIFFERENTIAL METHOD: ABNORMAL
EOSINOPHIL # BLD AUTO: 0.3 K/UL (ref 0–0.5)
EOSINOPHIL NFR BLD: 1.5 % (ref 0–8)
ERYTHROCYTE [DISTWIDTH] IN BLOOD BY AUTOMATED COUNT: 15.6 % (ref 11.5–14.5)
EST. GFR  (AFRICAN AMERICAN): >60 ML/MIN/1.73 M^2
EST. GFR  (NON AFRICAN AMERICAN): >60 ML/MIN/1.73 M^2
GLUCOSE SERPL-MCNC: 96 MG/DL (ref 70–110)
HCT VFR BLD AUTO: 29.4 % (ref 37–48.5)
HGB BLD-MCNC: 9 G/DL (ref 12–16)
IRON SERPL-MCNC: <10 UG/DL (ref 30–160)
LYMPHOCYTES # BLD AUTO: 2.8 K/UL (ref 1–4.8)
LYMPHOCYTES NFR BLD: 16.7 % (ref 18–48)
MAGNESIUM SERPL-MCNC: 2 MG/DL (ref 1.6–2.6)
MCH RBC QN AUTO: 24.7 PG (ref 27–31)
MCHC RBC AUTO-ENTMCNC: 30.6 G/DL (ref 32–36)
MCV RBC AUTO: 81 FL (ref 82–98)
MONOCYTES # BLD AUTO: 1.3 K/UL (ref 0.3–1)
MONOCYTES NFR BLD: 7.9 % (ref 4–15)
NEUTROPHILS # BLD AUTO: 12.3 K/UL (ref 1.8–7.7)
NEUTROPHILS NFR BLD: 73.7 % (ref 38–73)
PLATELET # BLD AUTO: 348 K/UL (ref 150–350)
PMV BLD AUTO: 10.1 FL (ref 9.2–12.9)
POTASSIUM SERPL-SCNC: 4 MMOL/L (ref 3.5–5.1)
PROT SERPL-MCNC: 7.3 G/DL (ref 6–8.4)
RBC # BLD AUTO: 3.64 M/UL (ref 4–5.4)
SATURATED IRON: ABNORMAL % (ref 20–50)
SODIUM SERPL-SCNC: 137 MMOL/L (ref 136–145)
TOTAL IRON BINDING CAPACITY: 364 UG/DL (ref 250–450)
TRANSFERRIN SERPL-MCNC: 246 MG/DL (ref 200–375)
WBC # BLD AUTO: 16.75 K/UL (ref 3.9–12.7)

## 2019-08-11 PROCEDURE — 25000003 PHARM REV CODE 250: Performed by: INTERNAL MEDICINE

## 2019-08-11 PROCEDURE — 25000003 PHARM REV CODE 250: Mod: ER | Performed by: INTERNAL MEDICINE

## 2019-08-11 PROCEDURE — 83540 ASSAY OF IRON: CPT

## 2019-08-11 PROCEDURE — 63600175 PHARM REV CODE 636 W HCPCS: Performed by: INTERNAL MEDICINE

## 2019-08-11 PROCEDURE — 85025 COMPLETE CBC W/AUTO DIFF WBC: CPT

## 2019-08-11 PROCEDURE — 63600175 PHARM REV CODE 636 W HCPCS: Mod: ER | Performed by: INTERNAL MEDICINE

## 2019-08-11 PROCEDURE — 82728 ASSAY OF FERRITIN: CPT

## 2019-08-11 PROCEDURE — S0030 INJECTION, METRONIDAZOLE: HCPCS | Performed by: INTERNAL MEDICINE

## 2019-08-11 PROCEDURE — 36415 COLL VENOUS BLD VENIPUNCTURE: CPT

## 2019-08-11 PROCEDURE — 21400001 HC TELEMETRY ROOM

## 2019-08-11 PROCEDURE — 80053 COMPREHEN METABOLIC PANEL: CPT

## 2019-08-11 PROCEDURE — 87040 BLOOD CULTURE FOR BACTERIA: CPT

## 2019-08-11 PROCEDURE — S0030 INJECTION, METRONIDAZOLE: HCPCS | Mod: ER | Performed by: INTERNAL MEDICINE

## 2019-08-11 PROCEDURE — 94761 N-INVAS EAR/PLS OXIMETRY MLT: CPT

## 2019-08-11 PROCEDURE — 25500020 PHARM REV CODE 255: Mod: ER | Performed by: INTERNAL MEDICINE

## 2019-08-11 PROCEDURE — 83735 ASSAY OF MAGNESIUM: CPT

## 2019-08-11 RX ORDER — METRONIDAZOLE 500 MG/100ML
500 INJECTION, SOLUTION INTRAVENOUS
Status: COMPLETED | OUTPATIENT
Start: 2019-08-11 | End: 2019-08-11

## 2019-08-11 RX ORDER — CIPROFLOXACIN 2 MG/ML
400 INJECTION, SOLUTION INTRAVENOUS
Status: DISCONTINUED | OUTPATIENT
Start: 2019-08-11 | End: 2019-08-12 | Stop reason: HOSPADM

## 2019-08-11 RX ORDER — BUPROPION HYDROCHLORIDE 150 MG/1
150 TABLET ORAL DAILY
Status: DISCONTINUED | OUTPATIENT
Start: 2019-08-11 | End: 2019-08-11

## 2019-08-11 RX ORDER — ONDANSETRON 2 MG/ML
4 INJECTION INTRAMUSCULAR; INTRAVENOUS EVERY 8 HOURS PRN
Status: DISCONTINUED | OUTPATIENT
Start: 2019-08-11 | End: 2019-08-12 | Stop reason: HOSPADM

## 2019-08-11 RX ORDER — CIPROFLOXACIN 500 MG/1
500 TABLET ORAL
Status: COMPLETED | OUTPATIENT
Start: 2019-08-11 | End: 2019-08-11

## 2019-08-11 RX ORDER — SODIUM CHLORIDE 0.9 % (FLUSH) 0.9 %
10 SYRINGE (ML) INJECTION
Status: DISCONTINUED | OUTPATIENT
Start: 2019-08-11 | End: 2019-08-12 | Stop reason: HOSPADM

## 2019-08-11 RX ORDER — ONDANSETRON 2 MG/ML
8 INJECTION INTRAMUSCULAR; INTRAVENOUS
Status: COMPLETED | OUTPATIENT
Start: 2019-08-11 | End: 2019-08-11

## 2019-08-11 RX ORDER — ACETAMINOPHEN 325 MG/1
650 TABLET ORAL EVERY 6 HOURS PRN
Status: DISCONTINUED | OUTPATIENT
Start: 2019-08-11 | End: 2019-08-12 | Stop reason: HOSPADM

## 2019-08-11 RX ORDER — METRONIDAZOLE 500 MG/100ML
500 INJECTION, SOLUTION INTRAVENOUS
Status: DISCONTINUED | OUTPATIENT
Start: 2019-08-11 | End: 2019-08-12 | Stop reason: HOSPADM

## 2019-08-11 RX ORDER — MORPHINE SULFATE 8 MG/ML
2 INJECTION INTRAMUSCULAR; INTRAVENOUS; SUBCUTANEOUS EVERY 4 HOURS PRN
Status: DISCONTINUED | OUTPATIENT
Start: 2019-08-11 | End: 2019-08-11

## 2019-08-11 RX ORDER — MORPHINE SULFATE 8 MG/ML
4 INJECTION INTRAMUSCULAR; INTRAVENOUS; SUBCUTANEOUS
Status: COMPLETED | OUTPATIENT
Start: 2019-08-11 | End: 2019-08-11

## 2019-08-11 RX ORDER — OXYCODONE AND ACETAMINOPHEN 7.5; 325 MG/1; MG/1
1 TABLET ORAL EVERY 4 HOURS PRN
Status: DISCONTINUED | OUTPATIENT
Start: 2019-08-11 | End: 2019-08-11

## 2019-08-11 RX ORDER — ONDANSETRON 8 MG/1
8 TABLET, ORALLY DISINTEGRATING ORAL EVERY 8 HOURS PRN
Status: DISCONTINUED | OUTPATIENT
Start: 2019-08-11 | End: 2019-08-11

## 2019-08-11 RX ORDER — MORPHINE SULFATE 10 MG/ML
4 INJECTION INTRAMUSCULAR; INTRAVENOUS; SUBCUTANEOUS EVERY 6 HOURS PRN
Status: DISCONTINUED | OUTPATIENT
Start: 2019-08-11 | End: 2019-08-12 | Stop reason: HOSPADM

## 2019-08-11 RX ORDER — SODIUM CHLORIDE, SODIUM LACTATE, POTASSIUM CHLORIDE, CALCIUM CHLORIDE 600; 310; 30; 20 MG/100ML; MG/100ML; MG/100ML; MG/100ML
INJECTION, SOLUTION INTRAVENOUS CONTINUOUS
Status: DISCONTINUED | OUTPATIENT
Start: 2019-08-11 | End: 2019-08-12 | Stop reason: HOSPADM

## 2019-08-11 RX ORDER — ENOXAPARIN SODIUM 100 MG/ML
40 INJECTION SUBCUTANEOUS EVERY 24 HOURS
Status: DISCONTINUED | OUTPATIENT
Start: 2019-08-11 | End: 2019-08-12 | Stop reason: HOSPADM

## 2019-08-11 RX ORDER — OXYCODONE HYDROCHLORIDE 5 MG/1
5 TABLET ORAL EVERY 6 HOURS PRN
Status: DISCONTINUED | OUTPATIENT
Start: 2019-08-11 | End: 2019-08-12 | Stop reason: HOSPADM

## 2019-08-11 RX ADMIN — SODIUM CHLORIDE, SODIUM LACTATE, POTASSIUM CHLORIDE, AND CALCIUM CHLORIDE: .6; .31; .03; .02 INJECTION, SOLUTION INTRAVENOUS at 04:08

## 2019-08-11 RX ADMIN — ONDANSETRON 4 MG: 2 INJECTION INTRAMUSCULAR; INTRAVENOUS at 06:08

## 2019-08-11 RX ADMIN — MORPHINE SULFATE 4 MG: 8 INJECTION, SOLUTION INTRAMUSCULAR; INTRAVENOUS at 01:08

## 2019-08-11 RX ADMIN — CIPROFLOXACIN 400 MG: 2 INJECTION, SOLUTION INTRAVENOUS at 01:08

## 2019-08-11 RX ADMIN — CIPROFLOXACIN HYDROCHLORIDE 500 MG: 500 TABLET, FILM COATED ORAL at 01:08

## 2019-08-11 RX ADMIN — ENOXAPARIN SODIUM 40 MG: 100 INJECTION SUBCUTANEOUS at 06:08

## 2019-08-11 RX ADMIN — ONDANSETRON 8 MG: 2 INJECTION INTRAMUSCULAR; INTRAVENOUS at 01:08

## 2019-08-11 RX ADMIN — OXYCODONE HYDROCHLORIDE 5 MG: 5 TABLET ORAL at 06:08

## 2019-08-11 RX ADMIN — OXYCODONE HYDROCHLORIDE AND ACETAMINOPHEN 1 TABLET: 7.5; 325 TABLET ORAL at 06:08

## 2019-08-11 RX ADMIN — METRONIDAZOLE 500 MG: 500 INJECTION, SOLUTION INTRAVENOUS at 02:08

## 2019-08-11 RX ADMIN — METRONIDAZOLE 500 MG: 500 INJECTION, SOLUTION INTRAVENOUS at 08:08

## 2019-08-11 NOTE — SUBJECTIVE & OBJECTIVE
History reviewed. No pertinent past medical history.    Past Surgical History:   Procedure Laterality Date     SECTION      x2    TUBAL LIGATION         Review of patient's allergies indicates:  No Known Allergies    No current facility-administered medications on file prior to encounter.      Current Outpatient Medications on File Prior to Encounter   Medication Sig    azithromycin (Z-JONO) 250 MG tablet 2 PO x 1 dose, then 1 PO Q day x 4 days    buPROPion (WELLBUTRIN XL) 150 MG TB24 tablet Take 150 mg by mouth once daily.    ibuprofen (ADVIL,MOTRIN) 600 MG tablet Take 1 tablet (600 mg total) by mouth every 6 (six) hours as needed for Pain. Take with food to prevent stomach ulcer/bleed    loratadine (CLARITIN) 10 mg tablet Take 1 tablet (10 mg total) by mouth once daily.     Family History     Problem Relation (Age of Onset)    Hypertension Father    No Known Problems Mother, Sister, Brother        Tobacco Use    Smoking status: Former Smoker     Types: Cigarettes     Last attempt to quit: 6/10/2019     Years since quittin.1    Smokeless tobacco: Never Used   Substance and Sexual Activity    Alcohol use: Yes     Comment: occassionally    Drug use: No    Sexual activity: Not Currently     Review of Systems   Constitutional: Positive for appetite change. Negative for chills, diaphoresis, fatigue, fever and unexpected weight change.   HENT: Negative.    Eyes: Negative.    Respiratory: Negative for cough, choking, shortness of breath and stridor.    Cardiovascular: Negative for chest pain.   Gastrointestinal: Positive for abdominal pain. Negative for abdominal distention, anal bleeding, blood in stool, constipation, diarrhea, nausea, rectal pain and vomiting.   Genitourinary: Negative.    Musculoskeletal: Negative.    Skin: Negative.    Neurological: Negative.    Hematological: Negative.    Psychiatric/Behavioral: Negative.      Objective:     Vital Signs (Most Recent):  Temp: 97.7 °F (36.5 °C)  (08/11/19 1143)  Pulse: 79 (08/11/19 1143)  Resp: 19 (08/11/19 1143)  BP: (!) 108/54 (08/11/19 1143)  SpO2: 99 % (08/11/19 1228) Vital Signs (24h Range):  Temp:  [97.3 °F (36.3 °C)-98.7 °F (37.1 °C)] 97.7 °F (36.5 °C)  Pulse:  [] 79  Resp:  [16-30] 19  SpO2:  [97 %-100 %] 99 %  BP: (108-133)/(53-79) 108/54     Weight: 100.5 kg (221 lb 9 oz)  Body mass index is 35.76 kg/m².    Physical Exam   Constitutional: She is oriented to person, place, and time. She appears well-developed and well-nourished. No distress.   Obese lady in NAD   HENT:   Head: Normocephalic and atraumatic.   Mouth/Throat: No oropharyngeal exudate.   Eyes: Pupils are equal, round, and reactive to light. Conjunctivae and EOM are normal. No scleral icterus.   Neck: Normal range of motion. Neck supple.   Cardiovascular: Normal rate, regular rhythm and normal heart sounds.   Pulmonary/Chest: Effort normal and breath sounds normal.   Abdominal: Soft. Bowel sounds are normal. She exhibits no distension and no mass. There is tenderness (mild LMQ tenderness). There is no rebound.   Musculoskeletal: Normal range of motion. She exhibits no edema, tenderness or deformity.   Neurological: She is alert and oriented to person, place, and time.   Skin: Skin is warm and dry. Capillary refill takes less than 2 seconds. She is not diaphoretic.   Psychiatric: She has a normal mood and affect. Her behavior is normal. Judgment and thought content normal.   Nursing note and vitals reviewed.        CRANIAL NERVES     CN III, IV, VI   Pupils are equal, round, and reactive to light.  Extraocular motions are normal.        Significant Labs: All pertinent labs within the past 24 hours have been reviewed.    Significant Imaging: I have reviewed and interpreted all pertinent imaging results/findings within the past 24 hours.

## 2019-08-11 NOTE — NURSING
Report on patient received from ale kwong rn on patients progress and updated handoff report sheet . Assessment done plan of care reviewed at bedside . Call light in reach head of bed up rails up x 2.

## 2019-08-11 NOTE — ED PROVIDER NOTES
"Encounter Date: 8/10/2019    SCRIBE #1 NOTE: I, Zhang Salas, am scribing for, and in the presence of,  Dr. Borjas. I have scribed the following portions of the note - Other sections scribed: HPI, ROS, PE.       History     Chief Complaint   Patient presents with    Abdominal Pain     PT C/O SUPRAPUBIC PAIN FOR 3 DAYS, DENIES ANY URINARY SYMPTOMS     Kwasi Arias is a 38 y.o. female who presents to the ED complaining of lower middle abdominal pain starting three days ago.  The states her abdominal pain is getting worse. The pt started as a small annoyance and now a "bump in the car" will cause severe pain. The pt states she took "gas and stomach pills" but no relief from pain. The pt states her last BM was today and was nml. The pt states her menstrual cycle starts next week. The pt reports of dysuria. The pt denies any allergies to medication.    The history is provided by the patient. No  was used.     Review of patient's allergies indicates:  No Known Allergies  History reviewed. No pertinent past medical history.  Past Surgical History:   Procedure Laterality Date     SECTION      x2    TUBAL LIGATION       Family History   Problem Relation Age of Onset    No Known Problems Mother     Hypertension Father     No Known Problems Sister     No Known Problems Brother      Social History     Tobacco Use    Smoking status: Former Smoker     Types: Cigarettes     Last attempt to quit: 6/10/2019     Years since quittin.1    Smokeless tobacco: Never Used   Substance Use Topics    Alcohol use: Yes     Comment: occassionally    Drug use: No     Review of Systems   Constitutional: Negative for fever.   Gastrointestinal: Positive for abdominal pain.   Genitourinary: Positive for dysuria.   All other systems reviewed and are negative.      Physical Exam     Initial Vitals [08/10/19 2041]   BP Pulse Resp Temp SpO2   129/79 94 20 97.3 °F (36.3 °C) 100 %      MAP       --     "     Physical Exam    Nursing note and vitals reviewed.  Constitutional: She appears well-developed and well-nourished.   HENT:   Head: Normocephalic and atraumatic.   Eyes: Conjunctivae are normal.   Neck: Normal range of motion. Neck supple.   Cardiovascular: Normal rate and intact distal pulses.   Pulmonary/Chest: Effort normal. No respiratory distress.   Abdominal: Bowel sounds are normal. There is tenderness in the suprapubic area.   Musculoskeletal: Normal range of motion.   Neurological: She is alert and oriented to person, place, and time.   Skin: Skin is warm and dry.   Psychiatric: She has a normal mood and affect.         ED Course   Critical Care  Date/Time: 8/10/2019 11:00 PM  Performed by: Tal Borjas MD  Authorized by: Supa Liz MD   Direct patient critical care time: 15 minutes  Ordering / reviewing critical care time: 5 minutes  Documentation critical care time: 15 minutes  Consulting other physicians critical care time: 5 minutes  Consult with family critical care time: 5 minutes  Total critical care time (exclusive of procedural time) : 45 minutes  Critical care was necessary to treat or prevent imminent or life-threatening deterioration of the following conditions: sepsis.  Critical care was time spent personally by me on the following activities: evaluation of patient's response to treatment, obtaining history from patient or surrogate, ordering and review of laboratory studies, re-evaluation of patient's condition, ordering and review of radiographic studies, examination of patient and development of treatment plan with patient or surrogate.        Labs Reviewed   POCT URINALYSIS W/O SCOPE - Abnormal; Notable for the following components:       Result Value    Glucose, UA Negative (*)     Bilirubin, UA Negative (*)     Ketones, UA Negative (*)     Blood, UA Negative (*)     Protein, UA Negative (*)     Nitrite, UA Negative (*)     Leukocytes, UA Negative (*)     All other components  within normal limits   POCT URINE PREGNANCY   POCT URINALYSIS W/O SCOPE   POCT CBC   POCT CMP              Imaging Results          CT Abdomen Pelvis With Contrast (Final result)  Result time 08/11/19 00:11:56    Final result by Stefano Bhardwaj MD (08/11/19 00:11:56)                 Impression:      1.  CT findings most suggestive of acute sigmoid diverticulitis.  There are few small foci of apparent extraluminal air along the anterior margin of the sigmoid colon suggestive of small contained perforation.  Imaging or endoscopic follow-up is advised following appropriate therapy to ensure resolution and exclude underlying mass.    2.  Mild hepatomegaly.  Ill-defined 1.2 cm hypoattenuating right hepatic lobe lesion not compatible with a simple cyst.  Follow-up evaluation is recommended with dedicated triple phase CT or hepatic MRI for further characterization.    3.  Additional incidental findings as above.      Electronically signed by: Stefano Bhardwaj MD  Date:    08/11/2019  Time:    00:11             Narrative:    EXAMINATION:  CT ABDOMEN PELVIS WITH CONTRAST    CLINICAL HISTORY:  RLQ pain, appendicitis suspected;    TECHNIQUE:  Low dose axial images, sagittal and coronal reformations were obtained from the lung bases to the pubic symphysis following the IV administration of 75 mL of Omnipaque 350 .  Oral contrast was not given.    COMPARISON:  None.    FINDINGS:  There are few bandlike opacities at the lung bases suggestive of platelike atelectasis or scarring.  There is no significant pleural effusion.  The visualized portions of the heart appear normal.    The liver is mildly enlarged.  There is a 1.2 cm ill-defined hypoattenuating right hepatic lobe lesion.  This is indeterminate, although does not demonstrate attenuation characteristics compatible with a simple cyst.  No additional focal hepatic abnormalities appreciated.  The portal vein is patent.  The gallbladder is contracted.  There is no intra-or  "extrahepatic biliary ductal dilatation.    The stomach, spleen, pancreas, and adrenal glands are unremarkable.    The kidneys are normal in size and location and enhance symmetrically.  There is no evidence of hydronephrosis. The urinary bladder is unremarkable. The uterus appears within normal limits.  There are bilateral tubal ligation clips present.    The abdominal aorta is normal in course and caliber with mild atherosclerotic calcification along its course.    There is no evidence of small-bowel obstruction.  There is an abnormal segment of sigmoid colon demonstrating wall thickening and moderate degree of adjacent inflammatory change in a region of diverticula in keeping with acute diverticulitis.  There are several small foci of serpiginous apparent extraluminal air along the anterior margin of the sigmoid colon measuring approximately 2.5 x 1.2 cm suggestive of a contained perforation (axial series 2, image 73).  There is no large volume of free intraperitoneal air.  There is no portal venous gas.  The remaining colon appears within normal limits.  The appendix is unremarkable.    Osseous structures demonstrate no acute abnormalities.  There is a well-defined sclerotic focus within the in the right ischial tuberosity suggestive of a bone island.  The extraperitoneal soft tissues are unremarkable.                                 Medical Decision Making:   Initial Assessment:   Kwasi Arias is a 38 y.o. female who presents to the ED complaining of lower middle abdominal pain starting three days ago.  The states her abdominal pain is getting worse. The pt started as a small annoyance and now a "bump in the car" will cause severe pain. The pt states she took "gas and stomach pills" but no relief from pain. The pt states her last BM was today and was nml. The pt states her menstrual cycle starts next week. The pt reports of dysuria. The pt denies any allergies to medication.  Differential Diagnosis: "   Acute cystitis  Acute appendicitis  Acute diverticulitis    Clinical Tests:   Lab Tests: Ordered and Reviewed  Radiological Study: Ordered and Reviewed  ED Management:  CBC reveals an elevated white blood cell count and CT of the abdomen pelvis with contrast reveals evidence of acute diverticulitis with free air and possible perforation.  Patient was transferred to [facility] for continuation of IV antibiotics and surgical consult.            Scribe Attestation:   Scribe #1: I performed the above scribed service and the documentation accurately describes the services I performed. I attest to the accuracy of the note.        This document was produced by a scribe under my direction and in my presence. I agree with the content of the note and have made any necessary edits.     Dr. Borjas    08/12/2019 5:09 AM          Clinical Impression:     1. Acute diverticulitis            Disposition:   Disposition: Transferred  Condition: Stable                        Tal Borjas MD  08/12/19 0513

## 2019-08-11 NOTE — ASSESSMENT & PLAN NOTE
Noted. Iron studies consistent with RAYMOND likely from heavy menses. Repeat lab in the AM. Will need iron pill OP once GI issues resolved. Otherwise stable with no s/s bleeds.

## 2019-08-11 NOTE — PLAN OF CARE
"SW met with patient to complete discharge needs assessment. SW reviewed with patient contents of "[facility]" including "help at home", "things patient responsible for to manage her health at home" and "preferences". Patient was able to verbalize her help at home is her spouse Van. SW discussed with patient the things she will be responsible for to manage her health at home would be going on doctor appointments, taking medications as prescribed, and getting prescriptions filled. SW wrote name and phone number on white communication board. Patient prefers morning doctor appointments. Patient reports her PCP is with [facility] [address].        [id number] - [place] - [address] [place] [id number]  [place] [address]  Phone: [phone] Fax: [phone]         08/11/19 9227   Discharge Assessment   Assessment Type Discharge Planning Assessment   Confirmed/corrected address and phone number on facesheet? Yes   Assessment information obtained from? Patient   Communicated expected length of stay with patient/caregiver no   Prior to hospitilization cognitive status: Alert/Oriented;No Deficits   Prior to hospitalization functional status: Independent   Current cognitive status: Alert/Oriented;No Deficits   Current Functional Status: Independent   Facility Arrived From:   (Home)   Lives With child(carlton), dependent;spouse   Able to Return to Prior Arrangements yes   Is patient able to care for self after discharge? Yes   Who are your caregiver(s) and their phone number(s)?   (Van (spouse) [phone])   Patient's perception of discharge disposition home or selfcare   Readmission Within the Last 30 Days no previous admission in last 30 days   Patient currently being followed by outpatient case management? No   Patient currently receives any other outside agency services? No   Equipment Currently Used at Home none   Do you have any problems " affording any of your prescribed medications? No   Is the patient taking medications as prescribed? yes   Does the patient have transportation home? Yes   Transportation Anticipated car, drives self;family or friend will provide   Dialysis Name and Scheduled days   (N/A)   Does the patient receive services at the [facility]? No   Discharge Plan A Home with family  (PCP F/U)   Discharge Plan B Home with family   DME Needed Upon Discharge  none   Patient/Family in Agreement with Plan yes

## 2019-08-11 NOTE — CONSULTS
[facility]  Consult Note    Patient Name: Kwasi Arias  MRN: [id number]  Admission Date: 8/10/2019  Hospital Length of Stay: 0 days  Code Status: Full Code   Primary Care Physician: [facility]  Principal Problem:Acute diverticulitis    Consults  Subjective:     Chief complaint: abd pain    HPI: 3-4 d hx of severe lower abd / suprapubic pain crampy / sharp apparently precipitated by diverticulitis based on imaging - contained perf, improving with antibiotics, pain now mild     Past medical history:  History reviewed. No pertinent past medical history.    Past surgical history:  Past Surgical History:   Procedure Laterality Date     SECTION      x2    TUBAL LIGATION         Social history:  Social History     Socioeconomic History    Marital status: Single     Spouse name: Not on file    Number of children: Not on file    Years of education: Not on file    Highest education level: Not on file   Occupational History    Not on file   Social Needs    Financial resource strain: Not on file    Food insecurity:     Worry: Not on file     Inability: Not on file    Transportation needs:     Medical: Not on file     Non-medical: Not on file   Tobacco Use    Smoking status: Former Smoker     Types: Cigarettes     Last attempt to quit: 6/10/2019     Years since quittin.1    Smokeless tobacco: Never Used   Substance and Sexual Activity    Alcohol use: Yes     Comment: occassionally    Drug use: No    Sexual activity: Not Currently   Lifestyle    Physical activity:     Days per week: Not on file     Minutes per session: Not on file    Stress: Not on file   Relationships    Social connections:     Talks on phone: Not on file     Gets together: Not on file     Attends [employer] service: Not on file     Active member of club or organization: Not on file     Attends meetings of clubs or organizations: Not on file     Relationship  status: Not on file   Other Topics Concern    Not on file   Social History Narrative    Not on file       Family history:  Family History   Problem Relation Age of Onset    No Known Problems Mother     Hypertension Father     No Known Problems Sister     No Known Problems Brother        Medications:  Medications Prior to Admission   Medication Sig Dispense Refill Last Dose    azithromycin (Z-JONO) 250 MG tablet 2 PO x 1 dose, then 1 PO Q day x 4 days 6 tablet 0 More than a month    buPROPion (WELLBUTRIN XL) 150 MG TB24 tablet Take 150 mg by mouth once daily.   More than a month    ibuprofen (ADVIL,MOTRIN) 600 MG tablet Take 1 tablet (600 mg total) by mouth every 6 (six) hours as needed for Pain. Take with food to prevent stomach ulcer/bleed 20 tablet 0     loratadine (CLARITIN) 10 mg tablet Take 1 tablet (10 mg total) by mouth once daily. 30 tablet 0        Allergies:  Review of patient's allergies indicates:  No Known Allergies    Review of systems:  CONSTITUTIONAL: Negative for fever, chills, weakness, weight loss, weight gain.  HEENT: Negative for blurred vision, hearing loss, nasal congestion, dry mouth, sore throat.  CARDIOVASCULAR: Negative for chest pain or palpitations.  RESPIRATORY: Negative for SOB or cough.  GASTROINTESTINAL: See HPI  GENITOURINARY: Negative for dysuria or hematuria.  MUSCULOSKELETAL: Negative for osteoarthritis or muscle pain.  SKIN: Negative for rashes/lesions.  NEUROLOGIC: Negative for headaches, numbness/tingling.  ENDOCRINE: Negative for diabetes or thyroid abnormalities.  HEMATOLOGIC: Negative for anemia or blood dyscrasias.  Aside from above positives, complete 10 point review of systems negative.    Objective:     Vital Signs (Most Recent):  Temp: 97.7 °F (36.5 °C) (08/11/19 1143)  Pulse: 79 (08/11/19 1143)  Resp: 19 (08/11/19 1143)  BP: (!) 108/54 (08/11/19 1143)  SpO2: 99 % (08/11/19 1228) Vital Signs (24h Range):  Temp:  [97.3 °F (36.3 °C)-98.7 °F (37.1 °C)] 97.7 °F  (36.5 °C)  Pulse:  [] 79  Resp:  [16-30] 19  SpO2:  [97 %-100 %] 99 %  BP: (108-133)/(53-79) 108/54     Physical examination:  General: well developed, well nourished, no apparent distress  HENT: NCAT, hearing grossly intact, no palpable or visible thyroid mass  Eyes: PERRL, EOMI, anicteric sclera  LYMH: No cervical, supraclavicular or axillary lymphadenopathy   Cardiovascular: Regular rate and rhythm. No murmurs appreciated.  Lungs: Non-labored respirations. Breath sounds equal.   Abdomen: soft, NTND, normoactive BS  Extremities: No C/C/E, 2+ dorsalis pedis pulses bilaterally  Neuro: AA&O x 3, no asterixes or tremors  Psych: Appropriate mood and affect. No SI.  Skin: No jaundice, rashes or lesions  Musculoskeletal: 5/5 strength bilaterally    Labs:  CBC:   Recent Labs   Lab 08/11/19  0852   WBC 16.75*   HGB 9.0*   HCT 29.4*        CMP:   Recent Labs   Lab 08/11/19  0852   GLU 96   CALCIUM 9.2   ALBUMIN 3.5   PROT 7.3      K 4.0   CO2 24      BUN 12   CREATININE 0.9   ALKPHOS 56   ALT 29   AST 22   BILITOT 0.5       Imaging:  CT: I have reviewed all results within the past 24 hours and my personal findings are:  acute diverticulitis, contained perf      Assessment:   Acute diverticulitis, complicated  Leukocytosis    Plan:   Continue IV antibiotics  Advance diet to clear liquids  CBC in AM  Outpatient colonoscopy in 4-6 weeks    [facility], should follow up with Dr. Randall Ervin or Dr. Sajan Rodriguez to arrange GI follow up on [facility] (or can go to [facility] for GI clinic)      Thank you for your consult.     Jamison Whitaker MD  Gastroenterology  [facility]

## 2019-08-11 NOTE — MEDICAL/APP STUDENT
"[facility] Hospital Medicine  History & Physical    Patient Name: Kwasi Arias  MRN: [id number]  Admission Date: 2019  Attending Physician: Supa Liz MD      [facility]     [facility]    CC:     Chief Complaint   Patient presents with    Abdominal Pain     PT C/O SUPRAPUBIC PAIN FOR 3 DAYS, DENIES ANY URINARY SYMPTOMS       HISTORY OF PRESENT ILLNESS:     Kwasi Arias is a 38 y.o. female with no significant PMHx; past surgical history that includes  section and Tubal ligation Presents to [facility] transferred from [facility] with c/o of constant progressively worsening lower abdominal pain (7/10) starting three days ago. Patient is unable to describe the pain but says "it feels like my insides are going to fall out". She has tried OTC pain medication and GasX without relief. Patient recalls similar symptoms (of lesser severity) in the past around her menstrual cycle but passed after a couple of days. Her next menstrual period in next week. Patient denies fever, chills, CP, SOB, N/V/D, constipation, melena, hematochezia, vaginal bleeding/discharge, urinary frequency, urgency, or hematuria. Patient endorses suprapubic pain, abdominal bloating, and pain while urinating. Patient is a former smoker, and occasionally drinks alcohol. She denies any allergies.     In the emergency department CT was performed: Findings most suggestive of acute sigmoid diverticulitis and few small foci of apparent extraluminal air along the anterior margin of the sigmoid colon suggestive of small contained perforation. Mild hepatomegaly.  Ill-defined 1.2 cm hypoattenuating right hepatic lobe lesion not compatible with a simple cyst.    Patient started on metronidazole IVPB 500 mg, ciprofloxacin HCl tablet 500 mg, morphine injection 4 mg, ondansetron injection 8 mg.    Hospital medicine has been asked to admit for further evaluation and " treatment.       REVIEW OF SYSTEMS:     -- Constitutional: Decreased appetite. No fever or chills.  -- Eyes: No visual changes, diplopia, pain, tearing, blind spots, or discharge.   -- Ears, nose, mouth, throat, and face: No congestion, sore throat, epistaxis, d/c, bleeding gums, neck stiffness masses, or dental issues.  -- Respiratory: (+) chronic cough. No shortness of breath, hemoptysis, stridor, wheezing, or night sweats.  -- Cardiovascular: No chest pain, GARCIA, syncope, PND, edema, cyanosis, or palpitations.   -- Gastrointestinal: Lower abdominal pain, bloating.  No vomiting, diarrhea, constipation hematemesis, melena, dyspepsia.  -- Genitourinary: Dysuria. No hematuria, frequency, urgency, nocturia, polyuria, stones, or incontinence.  -- Integument/breast: No rash, pruritis, pigmentation changes, dryness, or changes in hair  -- Hematologic/lymphatic: No easy bruising or lymphadenopathy.   -- Musculoskeletal: No acute arthralgias, acute myalgias, joint swelling, acute limitations of ROM, or acute muscular weakness.  -- Neurological: No seizures, headaches, incoordination, paraesthesias, ataxia, vertigo, or tremors.  -- Behavioral/Psych: No auditory or visual hallucinations, depression, or suicidal/homicidal ideations.  -- Endocrine: No heat or cold intolerance, polydipsia, or unintentional weight gain / loss.  -- Allergy/Immunologic: No recurrent infections or adverse reaction to food, insects, or difficulty breathing.    Pain Scale  7 on scale of 1 to 10    PAST MEDICAL / SURGICAL HISTORY:   History reviewed. No pertinent past medical history.  Past Surgical History:   Procedure Laterality Date     SECTION      x2    TUBAL LIGATION           FAMILY HISTORY:     Family History   Problem Relation Age of Onset    No Known Problems Mother     Hypertension Father     No Known Problems Sister     No Known Problems Brother          SOCIAL HISTORY:     Social History     Socioeconomic History    Marital  status: Single     Spouse name: Not on file    Number of children: Not on file    Years of education: Not on file    Highest education level: Not on file   Occupational History    Not on file   Social Needs    Financial resource strain: Not on file    Food insecurity:     Worry: Not on file     Inability: Not on file    Transportation needs:     Medical: Not on file     Non-medical: Not on file   Tobacco Use    Smoking status: Former Smoker     Types: Cigarettes     Last attempt to quit: 6/10/2019     Years since quittin.1    Smokeless tobacco: Never Used   Substance and Sexual Activity    Alcohol use: Yes     Comment: occassionally    Drug use: No    Sexual activity: Not Currently   Lifestyle    Physical activity:     Days per week: Not on file     Minutes per session: Not on file    Stress: Not on file   Relationships    Social connections:     Talks on phone: Not on file     Gets together: Not on file     Attends Gnosticist service: Not on file     Active member of club or organization: Not on file     Attends meetings of clubs or organizations: Not on file     Relationship status: Not on file   Other Topics Concern    Not on file   Social History Narrative    Not on file         ALLERGIES:       Review of patient's allergies indicates:  No Known Allergies          HOME MEDICATIONS:     Prior to Admission medications    Medication Sig Start Date End Date Taking? Authorizing Provider   azithromycin (Z-JONO) 250 MG tablet 2 PO x 1 dose, then 1 PO Q day x 4 days 17   Clarice Galindo MD   buPROPion (WELLBUTRIN XL) 150 MG TB24 tablet Take 150 mg by mouth once daily.    Historical Provider, MD   ibuprofen (ADVIL,MOTRIN) 600 MG tablet Take 1 tablet (600 mg total) by mouth every 6 (six) hours as needed for Pain. Take with food to prevent stomach ulcer/bleed 18   Toussaint Battley III, FNP   loratadine (CLARITIN) 10 mg tablet Take 1 tablet (10 mg total) by mouth once daily. 19  6/30/19  Lindsey Moss NP          HOSPITAL MEDICATIONS:     Scheduled Meds:    ciprofloxacin (CIPRO)400mg/200ml D5W IVPB  400 mg Intravenous Q12H    enoxaparin  40 mg Subcutaneous Daily    metronidazole  500 mg Intravenous Q8H     Continuous Infusions:   PRN Meds: acetaminophen, ondansetron, oxyCODONE, sodium chloride 0.9%      PHYSICAL EXAM:     Wt Readings from Last 1 Encounters:   08/11/19 0438 100.5 kg (221 lb 9 oz)   08/10/19 2041 95.3 kg (210 lb)     Body mass index is 35.76 kg/m².  Vitals:    08/11/19 0246 08/11/19 0301 08/11/19 0438 08/11/19 0817   BP: (!) 133/57 (!) 109/53 116/62 123/61   BP Location:   Right arm Right arm   Patient Position:   Lying Lying   Pulse: 93 96 91 82   Resp: (!) 30 16 19 19   Temp:   98.2 °F (36.8 °C) 98.5 °F (36.9 °C)   TempSrc:   Oral Oral   SpO2: 99% 98% 97% 98%   Weight:   100.5 kg (221 lb 9 oz)    Height:              -- General appearance: well developed. appears stated age   -- Head: normocephalic, atraumatic   -- Eyes: conjunctivae clear. Extraocular muscles intact  -- Nose: Nares normal. Septum midline.   -- Mouth/Throat: lips, mucosa, and tongue normal. no throat erythema.   -- Neck: supple, symmetrical, trachea midline, no JVD and thyroid not grossly enlarged, appears symmetric  -- Lungs: clear to auscultation bilaterally. normal respiratory effort. No use of accessory muscles.   -- Chest wall: no tenderness. equal bilateral chest rise   -- Heart: regular rate and regular rhythm. S1, S2 normal.  no click, rub or gallop   -- Abdomen: Suprapubic/LLQ tenderness. No CVA tenderness, no rebound, soft, non-distended, non-tympanic; bowel sounds normal; no masses  -- Extremities: no cyanosis, clubbing or edema.   -- Pulses: 2+ and symmetric   -- Skin: color normal, texture normal, turgor normal. No rashes or lesions.   -- Neurologic: Normal strength and tone. No focal numbness or weakness. CNII-XII intact. Any coma scale: eyes open spontaneously-4, oriented &  converses-5, obeys commands-6.      LABORATORY STUDIES:     Recent Results (from the past 36 hour(s))   POCT URINALYSIS W/O SCOPE    Collection Time: 08/10/19  8:47 PM   Result Value Ref Range    Glucose, UA Negative (NG)     Bilirubin, UA Negative (NG)     Ketones, UA Negative (NG)     Spec Grav UA 1.025     Blood, UA Negative (NG)     PH, UA 6.0     Protein, UA Negative (NG)     Urobilinogen, UA 0.2 E.U./dL    Nitrite, UA Negative (NG)     Leukocytes, UA Negative (NG)     Color, UA Yellow     Clarity, UA Clear    POCT urine pregnancy    Collection Time: 08/10/19  8:54 PM   Result Value Ref Range    POC Preg Test, Ur Negative Negative     Acceptable Yes    Comprehensive metabolic panel    Collection Time: 08/11/19  8:52 AM   Result Value Ref Range    Sodium 137 136 - 145 mmol/L    Potassium 4.0 3.5 - 5.1 mmol/L    Chloride 105 95 - 110 mmol/L    CO2 24 23 - 29 mmol/L    Glucose 96 70 - 110 mg/dL    BUN, Bld 12 6 - 20 mg/dL    Creatinine 0.9 0.5 - 1.4 mg/dL    Calcium 9.2 8.7 - 10.5 mg/dL    Total Protein 7.3 6.0 - 8.4 g/dL    Albumin 3.5 3.5 - 5.2 g/dL    Total Bilirubin 0.5 0.1 - 1.0 mg/dL    Alkaline Phosphatase 56 55 - 135 U/L    AST 22 10 - 40 U/L    ALT 29 10 - 44 U/L    Anion Gap 8 8 - 16 mmol/L    eGFR if African American >60 >60 mL/min/1.73 m^2    eGFR if non African American >60 >60 mL/min/1.73 m^2   CBC auto differential    Collection Time: 08/11/19  8:52 AM   Result Value Ref Range    WBC 16.75 (H) 3.90 - 12.70 K/uL    RBC 3.64 (L) 4.00 - 5.40 M/uL    Hemoglobin 9.0 (L) 12.0 - 16.0 g/dL    Hematocrit 29.4 (L) 37.0 - 48.5 %    Mean Corpuscular Volume 81 (L) 82 - 98 fL    Mean Corpuscular Hemoglobin 24.7 (L) 27.0 - 31.0 pg    Mean Corpuscular Hemoglobin Conc 30.6 (L) 32.0 - 36.0 g/dL    RDW 15.6 (H) 11.5 - 14.5 %    Platelets 348 150 - 350 K/uL    MPV 10.1 9.2 - 12.9 fL    Gran # (ANC) 12.3 (H) 1.8 - 7.7 K/uL    Lymph # 2.8 1.0 - 4.8 K/uL    Mono # 1.3 (H) 0.3 - 1.0 K/uL    Eos # 0.3 0.0 -  0.5 K/uL    Baso # 0.03 0.00 - 0.20 K/uL    Gran% 73.7 (H) 38.0 - 73.0 %    Lymph% 16.7 (L) 18.0 - 48.0 %    Mono% 7.9 4.0 - 15.0 %    Eosinophil% 1.5 0.0 - 8.0 %    Basophil% 0.2 0.0 - 1.9 %    Differential Method Automated    Magnesium    Collection Time: 08/11/19  8:52 AM   Result Value Ref Range    Magnesium 2.0 1.6 - 2.6 mg/dL        No results found for: INR, PROTIME  No results found for: HGBA1C  No results for input(s): POCTGLUCOSE in the last 72 hours.        MICROBIOLOGY DATA:     No results found for: LABGENI, LABREFE, LABUPPE, LABURIN, LABAFB    Microbiology x 7d:   Microbiology Results (last 7 days)     Procedure Component Value Units Date/Time    Blood culture [[id number]] Collected:  08/11/19 0141    Order Status:  Sent Specimen:  Blood from Peripheral, Antecubital, Left Updated:  08/11/19 0142    Blood culture [[id number]] Collected:  08/11/19 0131    Order Status:  Sent Specimen:  Blood from Peripheral, Antecubital, Right Updated:  08/11/19 0142            IMAGING:     Imaging Results          CT Abdomen Pelvis With Contrast (Final result)  Result time 08/11/19 00:11:56    Final result by Stefano Bhardwaj MD (08/11/19 00:11:56)                 Impression:      1.  CT findings most suggestive of acute sigmoid diverticulitis.  There are few small foci of apparent extraluminal air along the anterior margin of the sigmoid colon suggestive of small contained perforation.  Imaging or endoscopic follow-up is advised following appropriate therapy to ensure resolution and exclude underlying mass.    2.  Mild hepatomegaly.  Ill-defined 1.2 cm hypoattenuating right hepatic lobe lesion not compatible with a simple cyst.  Follow-up evaluation is recommended with dedicated triple phase CT or hepatic MRI for further characterization.    3.  Additional incidental findings as above.      Electronically signed by: Stefano Bhardwaj MD  Date:    08/11/2019  Time:    00:11             Narrative:    EXAMINATION:  CT  ABDOMEN PELVIS WITH CONTRAST    CLINICAL HISTORY:  RLQ pain, appendicitis suspected;    TECHNIQUE:  Low dose axial images, sagittal and coronal reformations were obtained from the lung bases to the pubic symphysis following the IV administration of 75 mL of Omnipaque 350 .  Oral contrast was not given.    COMPARISON:  None.    FINDINGS:  There are few bandlike opacities at the lung bases suggestive of platelike atelectasis or scarring.  There is no significant pleural effusion.  The visualized portions of the heart appear normal.    The liver is mildly enlarged.  There is a 1.2 cm ill-defined hypoattenuating right hepatic lobe lesion.  This is indeterminate, although does not demonstrate attenuation characteristics compatible with a simple cyst.  No additional focal hepatic abnormalities appreciated.  The portal vein is patent.  The gallbladder is contracted.  There is no intra-or extrahepatic biliary ductal dilatation.    The stomach, spleen, pancreas, and adrenal glands are unremarkable.    The kidneys are normal in size and location and enhance symmetrically.  There is no evidence of hydronephrosis. The urinary bladder is unremarkable. The uterus appears within normal limits.  There are bilateral tubal ligation clips present.    The abdominal aorta is normal in course and caliber with mild atherosclerotic calcification along its course.    There is no evidence of small-bowel obstruction.  There is an abnormal segment of sigmoid colon demonstrating wall thickening and moderate degree of adjacent inflammatory change in a region of diverticula in keeping with acute diverticulitis.  There are several small foci of serpiginous apparent extraluminal air along the anterior margin of the sigmoid colon measuring approximately 2.5 x 1.2 cm suggestive of a contained perforation (axial series 2, image 73).  There is no large volume of free intraperitoneal air.  There is no portal venous gas.  The remaining colon appears  within normal limits.  The appendix is unremarkable.    Osseous structures demonstrate no acute abnormalities.  There is a well-defined sclerotic focus within the in the right ischial tuberosity suggestive of a bone island.  The extraperitoneal soft tissues are unremarkable.                                  CONSULTS:     IP CONSULT TO GI  IP CONSULT TO GENERAL SURGERY       ASSESSMENT & PLAN:     Primary Diagnosis:  Acute diverticulitis    Active Hospital Problems    Diagnosis  POA    *Acute diverticulitis [K57.92]  Yes    Leukocytosis [D72.829]  Yes    Microcytic anemia [D50.9]  Yes    Obesity (BMI 30-39.9) [E66.9]  Yes      Resolved Hospital Problems   No resolved problems to display.       39 yo female c/o of constant progressively worsening lower abdominal pain starting three days ago.    Differential includes: IBD, UTI, Pyelonephritis, Appendicitis, SBO, Gastroenteritis.    Acute Diverticulitis   WBC 16.75, H&H 9/29.4, blood cultures pending. In the emergency department CT was performed: Findings most suggestive of acute sigmoid diverticulitis and few small foci of apparent extraluminal air along the anterior margin of the sigmoid colon suggestive of small contained perforation.     Lab results and imaging most consist with acute diverticulitis with contained perforation. Patient started on metronidazole IVPB 500 mg Q8H, ciprofloxacin HCl tablet 500 mg Q12H, morphine injection 4 mg, ondansetron injection 8 mg.     Symptoms improving. Patient resting comfortably at this time. GI and Surgery consulted. Continue scheduled medications.     Leukocytosis  Improved from yesterday 19.7>16.75. Continue antibiotics and recheck CBC in the morning.    Microcytic Anemia  TIBC, Iron, & Ferritin ordered.    VTE Risk Mitigation (From admission, onward)        Ordered     enoxaparin injection 40 mg  Daily      08/11/19 0127     Place SHIMA hose  Until discontinued      08/11/19 0127     Place sequential compression device   Until discontinued      08/11/19 0127     IP VTE HIGH RISK PATIENT  Once      08/11/19 0127            Adult PRN medications available   DVT prophylaxis given       DISPOSITION:     Will admit to the Hospital Medicine service for further evaluation and treatment.    Chart reviewed and updated where applicable.        ===============================================================    EVONNE Villegas

## 2019-08-11 NOTE — PROGRESS NOTES
gen surg  Consult dictated [id number]  prob diverticulitis w cont perf- bowel rest, iv abx, improving already from admit yest, consider elective resection in future after cscope as outpt

## 2019-08-11 NOTE — NURSING
0900- remains npo status , spouse at beside . Denies pain . Patient updated on plan of care . Call light in reach . Loss of iv access. Will restart soon as possible. Patient agreeable. No changes on telemetry. Rails up x 3 call light in reach too.       1100- patient denies pain remains npo for now has seen both consults and attending. No changes on telemetry.  at beside. Rails up x 3 call light in reach . Iv out.       1245- new iv to left forearm started by Nitin BRIGHT patient tolerated well. Restarted flagyl just about completed no adverse side effects.  No telemetry changes. Remains npo for now. Continue to monitor. No nausea or vomiting has not complained of abdominal pain.         1432- second antibiotic infused no adverse side effects . Updated patient on new orders received to hang iv fluids lactated ringers solution explained purpose of fluid therapy and also informed patient now has diet for clear liquids . Patient voiced she understands . Patient instructed to report severe abdominal pain and to stop eating or drinking anything . Patient voiced understanding. No changes on telemetry . Call light in reach head of bed up rails up x 3.

## 2019-08-11 NOTE — HPI
37 yo female with no significant pmhx presenting with progressively worsening cramping lower abdominal pain for the past 4-5 days with associated loss of appetite. Patient denied any fevers, chills, night sweats, cp, sob, nausea, emesis, diarrhea, constipation, dysuria or bleeds. Tired OTC indigestion and gas meds with no relief prompting ED visit. Unsure what prompted abdominal pain, no relation with food. No trauma. Had similar issue about few months back which resolved in few days, she did not seek medical attention. Last BM yesterday, normal. Denies NSAIDs use, PUD hx or recent travel. She is not on her menstrual cycle.    In the ED patient was HDS and afebrile. Labs significant for leucocytosis other wise labs unremarkable. CT abdomen noting acute sigmoid diverticulitis with small contained perforation; mild hepatomegaly. Admitted to .

## 2019-08-11 NOTE — ASSESSMENT & PLAN NOTE
As per CT scan. Surgery consulted. Started board spectrum abx, continue. Bowel rest. IVF. Improving since admission. Will need OP colonoscopy, and OP general surgery fu +/-  resection.

## 2019-08-11 NOTE — CONSULTS
CONSULTING PHYSICIAN:  Dr. Liz.    REASON FOR CONSULTATION:  Consult is directed to Dr. Jayro Figueroa regarding   diverticulitis.    HISTORY OF PRESENT ILLNESS:  This is a 38-year-old female who developed about 3   days ago of centralized lower abdominal pain, which was constant.  She reports   decreased appetite over the last couple of days.  She has been having regular   brown bowel movements.  Her last one was yesterday.  She denies fever or chills.    She denies vaginal discharge, fecaluria or pneumaturia.  She has never had a   colonoscopy.  She denies a family history of colorectal cancer.  She had a   similar episode a couple of months ago, which resolved on its own after a couple   of days with no treatment.    PAST MEDICAL HISTORY:  None.    PAST SURGICAL HISTORY:   x2 and tubal ligation.    ALLERGIES:  No known drug allergies.    SOCIAL HISTORY:  The patient is a former smoker.  She quit recently and she   socially consumes alcoholic beverages.    PHYSICAL EXAMINATION:  GENERAL:  Alert and oriented x4.  HEENT:  No cervical or supraclavicular masses.  LUNGS:  Clear to auscultation bilaterally.  CARDIOVASCULAR:  Regular rate and rhythm.  AXILLAE:  No masses bilaterally.  GENITOURINARY:  No inguinal hernia bilaterally.  ABDOMEN:  Obese, soft.  Positive bowel sounds, nondistended.  No masses.  No   hernia.  Mild-to-moderate suprapubic tenderness.  No peritoneal signs.  EXTREMITIES:  Palpable radial pulse bilaterally.    LABORATORY DATA:  White count 10.  CT scan of the abdomen and pelvis revealed   inflammatory changes of the sigmoid colon consistent with diverticulitis and a   small amount of extraluminal air consistent with contained perforation - I have   reviewed the films.  There is also a liver lesion, which likely needs further   imaging to help delineate.    ASSESSMENT AND PLAN:  Complicated diverticulitis/contained perforation - I agree   with admit bowel rest and intravenous  antibiotics.  She is already starting to   improve from yesterday.  As she continues to improve, start oral intake and   transition to oral antibiotics, which she will continue as an outpatient for   about a week.  I discussed with her the importance of colonoscopy in the next   month or two, also discussed with her after this that we should consider   elective resection since this is her second episode likely recently and she had   a contained perforation.    Thank you for consult.  We will follow.      TAWNYA/NEIL  dd: 08/11/2019 10:03:40 (CDT)  td: 08/11/2019 14:13:08 (CDT)  Doc ID   #[id number]  Job ID #[id number]    CC:

## 2019-08-11 NOTE — H&P
[facility] Hospital Medicine  History & Physical    Patient Name: Kwasi Arias  MRN: [id number]  Admission Date: 8/10/2019  Attending Physician: Supa Liz MD   Primary Care Provider: [facility]    Patient information was obtained from patient, past medical records and ER records.     Subjective:     Principal Problem:Acute diverticulitis    Chief Complaint:   Chief Complaint   Patient presents with    Abdominal Pain     PT C/O SUPRAPUBIC PAIN FOR 3 DAYS, DENIES ANY URINARY SYMPTOMS        HPI: 37 yo female with no significant pmhx presenting with progressively worsening cramping lower abdominal pain for the past 4-5 days with associated loss of appetite. Patient denied any fevers, chills, night sweats, cp, sob, nausea, emesis, diarrhea, constipation, dysuria or bleeds. Tired OTC indigestion and gas meds with no relief prompting ED visit. Unsure what prompted abdominal pain, no relation with food. No trauma. Had similar issue about few months back which resolved in few days, she did not seek medical attention. Last BM yesterday, normal. Denies NSAIDs use, PUD hx or recent travel. She is not on her menstrual cycle.    In the ED patient was HDS and afebrile. Labs significant for leucocytosis other wise labs unremarkable. CT abdomen noting acute sigmoid diverticulitis with small contained perforation; mild hepatomegaly. Admitted to .    History reviewed. No pertinent past medical history.    Past Surgical History:   Procedure Laterality Date     SECTION      x2    TUBAL LIGATION         Review of patient's allergies indicates:  No Known Allergies    No current facility-administered medications on file prior to encounter.      Current Outpatient Medications on File Prior to Encounter   Medication Sig    azithromycin (Z-JONO) 250 MG tablet 2 PO x 1 dose, then 1 PO Q day x 4 days    buPROPion (WELLBUTRIN XL) 150 MG TB24 tablet Take 150 mg by mouth once  daily.    ibuprofen (ADVIL,MOTRIN) 600 MG tablet Take 1 tablet (600 mg total) by mouth every 6 (six) hours as needed for Pain. Take with food to prevent stomach ulcer/bleed    loratadine (CLARITIN) 10 mg tablet Take 1 tablet (10 mg total) by mouth once daily.     Family History     Problem Relation (Age of Onset)    Hypertension Father    No Known Problems Mother, Sister, Brother        Tobacco Use    Smoking status: Former Smoker     Types: Cigarettes     Last attempt to quit: 6/10/2019     Years since quittin.1    Smokeless tobacco: Never Used   Substance and Sexual Activity    Alcohol use: Yes     Comment: occassionally    Drug use: No    Sexual activity: Not Currently     Review of Systems   Constitutional: Positive for appetite change. Negative for chills, diaphoresis, fatigue, fever and unexpected weight change.   HENT: Negative.    Eyes: Negative.    Respiratory: Negative for cough, choking, shortness of breath and stridor.    Cardiovascular: Negative for chest pain.   Gastrointestinal: Positive for abdominal pain. Negative for abdominal distention, anal bleeding, blood in stool, constipation, diarrhea, nausea, rectal pain and vomiting.   Genitourinary: Negative.    Musculoskeletal: Negative.    Skin: Negative.    Neurological: Negative.    Hematological: Negative.    Psychiatric/Behavioral: Negative.      Objective:     Vital Signs (Most Recent):  Temp: 97.7 °F (36.5 °C) (19 1143)  Pulse: 79 (19 1143)  Resp: 19 (19 1143)  BP: (!) 108/54 (19 1143)  SpO2: 99 % (19 1228) Vital Signs (24h Range):  Temp:  [97.3 °F (36.3 °C)-98.7 °F (37.1 °C)] 97.7 °F (36.5 °C)  Pulse:  [] 79  Resp:  [16-30] 19  SpO2:  [97 %-100 %] 99 %  BP: (108-133)/(53-79) 108/54     Weight: 100.5 kg (221 lb 9 oz)  Body mass index is 35.76 kg/m².    Physical Exam   Constitutional: She is oriented to person, place, and time. She appears well-developed and well-nourished. No distress.   Obese lady in  NAD   HENT:   Head: Normocephalic and atraumatic.   Mouth/Throat: No oropharyngeal exudate.   Eyes: Pupils are equal, round, and reactive to light. Conjunctivae and EOM are normal. No scleral icterus.   Neck: Normal range of motion. Neck supple.   Cardiovascular: Normal rate, regular rhythm and normal heart sounds.   Pulmonary/Chest: Effort normal and breath sounds normal.   Abdominal: Soft. Bowel sounds are normal. She exhibits no distension and no mass. There is tenderness (mild LMQ tenderness). There is no rebound.   Musculoskeletal: Normal range of motion. She exhibits no edema, tenderness or deformity.   Neurological: She is alert and oriented to person, place, and time.   Skin: Skin is warm and dry. Capillary refill takes less than 2 seconds. She is not diaphoretic.   Psychiatric: She has a normal mood and affect. Her behavior is normal. Judgment and thought content normal.   Nursing note and vitals reviewed.        CRANIAL NERVES     CN III, IV, VI   Pupils are equal, round, and reactive to light.  Extraocular motions are normal.        Significant Labs: All pertinent labs within the past 24 hours have been reviewed.    Significant Imaging: I have reviewed and interpreted all pertinent imaging results/findings within the past 24 hours.    Assessment/Plan:     * Acute diverticulitis with contained perforation  As per CT scan. Surgery consulted. Started board spectrum abx, continue. Bowel rest. IVF. Improving since admission. Will need OP colonoscopy, and OP general surgery fu +/-  resection.    Obesity (BMI 30-39.9)  Lifestyle modifications endorsed.    Microcytic anemia  Noted. Iron studies consistent with RAYMOND likely from heavy menses. Repeat lab in the AM. Will need iron pill OP once GI issues resolved. Otherwise stable with no s/s bleeds.    Leukocytosis  Noted. Reactive to above. Trending down for OSH labs.      VTE Risk Mitigation (From admission, onward)        Ordered     enoxaparin injection 40 mg   Daily      08/11/19 0127     Place SHIMA hose  Until discontinued      08/11/19 0127     Place sequential compression device  Until discontinued      08/11/19 0127     IP VTE HIGH RISK PATIENT  Once      08/11/19 0127             Supa Liz MD  Department of Hospital Medicine   [facility]

## 2019-08-11 NOTE — PLAN OF CARE
Problem: Adult Inpatient Plan of Care  Goal: Plan of Care Review  Outcome: Ongoing (interventions implemented as appropriate)     08/11/19 2594   Plan of Care Review   Plan of Care Reviewed With patient   Progress no change

## 2019-08-12 VITALS
HEIGHT: 66 IN | HEART RATE: 79 BPM | RESPIRATION RATE: 19 BRPM | OXYGEN SATURATION: 99 % | DIASTOLIC BLOOD PRESSURE: 72 MMHG | WEIGHT: 221.56 LBS | TEMPERATURE: 98 F | SYSTOLIC BLOOD PRESSURE: 134 MMHG | BODY MASS INDEX: 35.61 KG/M2

## 2019-08-12 PROBLEM — D72.829 LEUKOCYTOSIS: Status: RESOLVED | Noted: 2019-08-11 | Resolved: 2019-08-12

## 2019-08-12 LAB
BASOPHILS # BLD AUTO: 0.03 K/UL (ref 0–0.2)
BASOPHILS NFR BLD: 0.3 % (ref 0–1.9)
DIFFERENTIAL METHOD: ABNORMAL
EOSINOPHIL # BLD AUTO: 0.3 K/UL (ref 0–0.5)
EOSINOPHIL NFR BLD: 2.6 % (ref 0–8)
ERYTHROCYTE [DISTWIDTH] IN BLOOD BY AUTOMATED COUNT: 15.5 % (ref 11.5–14.5)
FERRITIN SERPL-MCNC: 105 NG/ML (ref 20–300)
HCT VFR BLD AUTO: 27.9 % (ref 37–48.5)
HGB BLD-MCNC: 8.5 G/DL (ref 12–16)
LYMPHOCYTES # BLD AUTO: 2.5 K/UL (ref 1–4.8)
LYMPHOCYTES NFR BLD: 23 % (ref 18–48)
MCH RBC QN AUTO: 24.9 PG (ref 27–31)
MCHC RBC AUTO-ENTMCNC: 30.5 G/DL (ref 32–36)
MCV RBC AUTO: 82 FL (ref 82–98)
MONOCYTES # BLD AUTO: 0.7 K/UL (ref 0.3–1)
MONOCYTES NFR BLD: 6.8 % (ref 4–15)
NEUTROPHILS # BLD AUTO: 7.3 K/UL (ref 1.8–7.7)
NEUTROPHILS NFR BLD: 67.6 % (ref 38–73)
PLATELET # BLD AUTO: 353 K/UL (ref 150–350)
PMV BLD AUTO: 9.4 FL (ref 9.2–12.9)
RBC # BLD AUTO: 3.41 M/UL (ref 4–5.4)
WBC # BLD AUTO: 10.84 K/UL (ref 3.9–12.7)

## 2019-08-12 PROCEDURE — 85025 COMPLETE CBC W/AUTO DIFF WBC: CPT

## 2019-08-12 PROCEDURE — 63600175 PHARM REV CODE 636 W HCPCS: Performed by: INTERNAL MEDICINE

## 2019-08-12 PROCEDURE — 25000003 PHARM REV CODE 250: Performed by: INTERNAL MEDICINE

## 2019-08-12 PROCEDURE — 36415 COLL VENOUS BLD VENIPUNCTURE: CPT

## 2019-08-12 PROCEDURE — S0030 INJECTION, METRONIDAZOLE: HCPCS | Performed by: INTERNAL MEDICINE

## 2019-08-12 PROCEDURE — 94761 N-INVAS EAR/PLS OXIMETRY MLT: CPT

## 2019-08-12 RX ORDER — METRONIDAZOLE 500 MG/1
500 TABLET ORAL EVERY 8 HOURS
Qty: 30 TABLET | Refills: 0 | Status: SHIPPED | OUTPATIENT
Start: 2019-08-12 | End: 2019-08-22

## 2019-08-12 RX ORDER — CIPROFLOXACIN 750 MG/1
750 TABLET, FILM COATED ORAL EVERY 12 HOURS
Qty: 20 TABLET | Refills: 0 | Status: SHIPPED | OUTPATIENT
Start: 2019-08-12 | End: 2019-08-22

## 2019-08-12 RX ADMIN — OXYCODONE HYDROCHLORIDE 5 MG: 5 TABLET ORAL at 04:08

## 2019-08-12 RX ADMIN — CIPROFLOXACIN 400 MG: 2 INJECTION, SOLUTION INTRAVENOUS at 01:08

## 2019-08-12 RX ADMIN — SODIUM CHLORIDE, SODIUM LACTATE, POTASSIUM CHLORIDE, AND CALCIUM CHLORIDE: .6; .31; .03; .02 INJECTION, SOLUTION INTRAVENOUS at 01:08

## 2019-08-12 RX ADMIN — METRONIDAZOLE 500 MG: 500 INJECTION, SOLUTION INTRAVENOUS at 04:08

## 2019-08-12 NOTE — PLAN OF CARE
Problem: Pain Acute  Goal: Optimal Pain Control  Outcome: Ongoing (interventions implemented as appropriate)  Intervention: Prevent or Manage Pain     08/12/19 0348   Prevent or Manage Pain   Sensory Stimulation Regulation care clustered;music/television provided for relaxation;lighting decreased;quiet environment promoted   Sleep/Rest Enhancement awakenings minimized;consistent schedule promoted;relaxation techniques promoted;regular sleep/rest pattern promoted

## 2019-08-12 NOTE — NURSING
1630- iv fluids started explained purpose to patient agreeable. Spouse at beside. Patient resting in bed watching tv no nausea . Call light in reach head of bed up rails up x 3. Patient informed she now has a diet of clear liquids ordered at this time. No changes on telemetry .       1850 medicated for pain and nausea. Tolerating iv fluids no vomiting patient reports she is passing gas with stomach upset noted at times. No changes on telemetry. Rails up x 3 call light in reach . Family members at bedside.       1930 bedside rounding report given to joana muro rn and updated handoff report sheet . No acute events.

## 2019-08-12 NOTE — PLAN OF CARE
"TN reviewed follow up appointment information as well as  "GI discharge instructions" handout with patient using teach back. Patient stated she will notify the doctor if she has blood in her stools and severe N&V. Patient is in agreement and verbalized an understanding. Placed discharge information in blue discharge folder.  TN also reviewed patient responsibility checklist with her using teach back. Patient was able to verbalize her responsibilities after discharge to manage her care at home bein. Going to follow up appointments   2. Picking up rx from the pharmacy when discharged  3. Taking her medications as prescribed     Patient informed to contact her insurance company to verify her insurance.      Patient's nurse, Frederick, informed that patient can discharge from  standpoint.       19 1035   Final Note   Assessment Type Discharge Planning Assessment   Anticipated Discharge Disposition Home   What phone number can be called within the next 1-3 days to see how you are doing after discharge?   ([phone])   Hospital Follow Up  Appt(s) scheduled? Yes   Discharge plans and expectations educations in teach back method with documentation complete? Yes   Right Care Referral Info   Post Acute Recommendation No Care     "

## 2019-08-12 NOTE — ASSESSMENT & PLAN NOTE
Noted. Iron studies consistent with RAYMOND likely from menses. Will need iron pill OP once GI issues resolved. PCP to fu on this. Otherwise stable with no s/s bleeds.

## 2019-08-12 NOTE — PROGRESS NOTES
Bedside report given to oncoming nurse MITCHEL Keenan noted. Pt lying in bed, Respirations even et unlabored. Safety maintained, Call light within reach.     24 hr chart check completed.

## 2019-08-12 NOTE — PROGRESS NOTES
[facility] CASE MANAGEMENT                  WRITTEN DISCHARGE INFORMATION      APPOINTMENTS AND RESOURCES TO HELP YOU MANAGE YOUR CARE AT HOME BASED ON YOUR PREFERENCES:  (If an appointment is not scheduled for you when you leave the hospital, call your doctor to schedule a follow up visit within a week)    Follow-up Information     Jayro Garces III, MD On 8/27/2019.    Specialty:  Surgery  Why:  Surgery appt on Tuesday @ 2:20pm. Co-pay is $150 if insurance is not approved   Contact information:  [address]  [place] [address]  [phone]             Randall Ervin MD On 9/5/2019.    Specialty:  Gastroenterology  Why:  GI appt on Thursday @ 7:00am for Colonscopy . Please call before your procedure to provide insurance information  Contact information:  [address] [place] [address]  [address]  [place] [address]  [phone]             [facility] In 1 week.    Why:  Please call to scheduel your PCP appt in 1 week   Contact information:  [address] [place] [address]  [phone]                       Healthy Living Instructions to HELP MANAGE YOUR CARE AT HOME:  Things You are responsible for:  1.    Getting your prescriptions filled   2.    Taking your medications as directed, DO NOT MISS ANY DOSES!  3.    Following the diet and exercise recommended by your doctor  4.    Going to your follow-up doctor appointment. This is important because it allows the doctor to monitor your progress and determine if any changes need to made to your treatment plan.  5. If you have any questions about MANAGING YOUR CARE AT HOME Call the Nurse Care Line for 24/7 Assistance [phone]       Please answer any calls you may receive from [facility]. We want to continue to support you as you manage your healthcare needs. [facility] is happy to have the opportunity to serve you.      Thank you for choosing [facility] for your healthcare needs!  Your [facility] Case Management  Team,

## 2019-08-12 NOTE — DISCHARGE SUMMARY
[facility] Hospital Medicine  Discharge Summary      Patient Name: Kwasi Arias  MRN: [id number]  Admission Date: 8/10/2019  Hospital Length of Stay: 1 days  Discharge Date and Time:  08/12/2019 10:25 AM  Attending Physician: Supa Liz MD   Discharging Provider: Supa Liz MD  Primary Care Provider: [facility]      HPI:   39 yo female with no significant pmhx presenting with progressively worsening cramping lower abdominal pain for the past 4-5 days with associated loss of appetite. Patient denied any fevers, chills, night sweats, cp, sob, nausea, emesis, diarrhea, constipation, dysuria or bleeds. Tired OTC indigestion and gas meds with no relief prompting ED visit. Unsure what prompted abdominal pain, no relation with food. No trauma. Had similar issue about few months back which resolved in few days, she did not seek medical attention. Last BM yesterday, normal. Denies NSAIDs use, PUD hx or recent travel. She is not on her menstrual cycle.    In the ED patient was HDS and afebrile. Labs significant for leucocytosis other wise labs unremarkable. CT abdomen noting acute sigmoid diverticulitis with small contained perforation; mild hepatomegaly. Admitted to .    * No surgery found *      Hospital Course:   39 yo female presenting with lower abdominal pain, admiited for acute diverticulitis with contained perforation as per CT scan. Started cipro/flagll and IVF. Surgery and GI consulted. Bowel rest advanced to clears. Improving since admission. Continued abx and IVF for now. Now diet advanced to regular, and tolerated well. Okay to d/c as per surgery with oral abx cipro/flagyll for 10 days. Will need OP colonoscopy, and OP general surgery fu +/-  Resection. PCP fu on discharge. Highly endorsed high fiber diet at home with low fat diet.     Consults:   Consults (From admission, onward)        Status Ordering Provider     Inpatient consult to Gastroenterology  Once      Provider:  Josr Urrutia MD    Completed DELANEY CHUNG     Inpatient consult to General Surgery  Once     Provider:  Jayro Garces III, MD    Acknowledged DELANEY CHUNG          * Acute diverticulitis with contained perforation  As per CT scan. Surgery consulted. Started board spectrum abx, continue. Bowel rest. IVF. Improving since admission. Will need OP colonoscopy, and OP general surgery fu +/-  resection.    Obesity (BMI 30-39.9)  Lifestyle modifications endorsed.    Microcytic anemia  Noted. Iron studies consistent with RAYMOND likely from menses. Will need iron pill OP once GI issues resolved. PCP to fu on this. Otherwise stable with no s/s bleeds.       Final Active Diagnoses:    Diagnosis Date Noted POA    PRINCIPAL PROBLEM:  Acute diverticulitis with contained perforation [K57.92] 08/11/2019 Yes    Microcytic anemia [D50.9] 08/11/2019 Yes    Obesity (BMI 30-39.9) [E66.9] 08/11/2019 Yes      Problems Resolved During this Admission:    Diagnosis Date Noted Date Resolved POA    Leukocytosis [D72.829] 08/11/2019 08/12/2019 Yes       Discharged Condition: stable    Disposition: Home or Self Care    Follow Up:  Follow-up Information     Jayro Garces III, MD On 8/27/2019.    Specialty:  Surgery  Why:  Surgery appt on Tuesday @ 2:20pm. Co-pay is $150 if insurance is not approved   Contact information:  [address] [place] [address] [phone]             Randall Ervin MD On 9/5/2019.    Specialty:  Gastroenterology  Why:  GI appt on Thursday @ 7:00am for Colonscopy . Please call before your procedure to provide insurance information  Contact information:  [address] [place] [address] [address]  [address]  [facility] [address] [phone]             [facility] In 1 week.    Why:  Please call to scheduel your PCP appt in 1 week   Contact information:  [address] [place] [address] [phone]                 Patient Instructions:      Ambulatory consult to Gastroenterology    Referral Priority: Routine Referral Type: Consultation   Referral Reason: Specialty Services Required   Requested Specialty: Gastroenterology   Number of Visits Requested: 1       Significant Diagnostic Studies: Labs: All labs within the past 24 hours have been reviewed    Pending Diagnostic Studies:     None         Medications:  Reconciled Home Medications:      Medication List      START taking these medications    ciprofloxacin HCl 750 MG tablet  Commonly known as:  CIPRO  Take 1 tablet (750 mg total) by mouth every 12 (twelve) hours. for 10 days     metroNIDAZOLE 500 MG tablet  Commonly known as:  FLAGYL  Take 1 tablet (500 mg total) by mouth every 8 (eight) hours. for 10 days        CONTINUE taking these medications    buPROPion 150 MG TB24 tablet  Commonly known as:  WELLBUTRIN XL  Take 150 mg by mouth once daily.        STOP taking these medications    azithromycin 250 MG tablet  Commonly known as:  Z-JONO     ibuprofen 600 MG tablet  Commonly known as:  ADVIL,MOTRIN     loratadine 10 mg tablet  Commonly known as:  CLARITIN            Indwelling Lines/Drains at time of discharge:   Lines/Drains/Airways          None          Time spent on the discharge of patient: > 30 minutes  Patient was seen and examined on the date of discharge and determined to be suitable for discharge.         Supa Liz MD  Department of Hospital Medicine  [facility]

## 2019-08-12 NOTE — NURSING
Discharge instructions given to patient at bedside. Patient  verbalized an understanding and states a willingness to comply. Saline lock removed. Tele monitoring removed.

## 2019-08-12 NOTE — HOSPITAL COURSE
37 yo female presenting with lower abdominal pain, admiited for acute diverticulitis with contained perforation as per CT scan. Started cipro/flagll and IVF. Surgery and GI consulted. Bowel rest advanced to clears. Improving since admission. Continued abx and IVF for now. Now diet advanced to regular, and tolerated well. Okay to d/c as per surgery. Will need OP colonoscopy, and OP general surgery fu +/-  Resection. PCP fu on discharge. Highly endorsed high fiber diet at home with low fat diet.

## 2019-08-14 ENCOUNTER — PATIENT OUTREACH (OUTPATIENT)
Dept: ADMINISTRATIVE | Facility: CLINIC | Age: 39
End: 2019-08-14

## 2019-08-14 NOTE — PROGRESS NOTES
C3 nurse attempted to contact patient. No answer. The following message was left for the patient to return the call:  Good morning I am a nurse calling on behalf of [facility] from the Care Coordination Center.  This is a Transitional Care Call for Kwasi Arias . When you have a moment please contact us at [phone] or [phone] Monday through Friday, between the hours of 8 am to 4 pm. We look forward to speaking with you. On behalf of [facility] have a nice day.    The patient does not have a scheduled HOSFU appointment within 7-14 days post hospital discharge date 8/12. Message sent to Physician staff to assist with HOSFU appointment scheduling.NON [facility]

## 2019-08-16 LAB
BACTERIA BLD CULT: NORMAL
BACTERIA BLD CULT: NORMAL

## 2019-08-16 NOTE — PROGRESS NOTES
C3 nurse returned pt's call; Pt lost her ID and asking what to do; Recommended pt contact the hospital and check with Lost and Found and/or 3rd floor charge nurse and if unsuccessful, contact [facility] and ask for Admitting dept. Verbalized understanding.

## 2019-11-24 ENCOUNTER — HOSPITAL ENCOUNTER (EMERGENCY)
Facility: HOSPITAL | Age: 39
Discharge: LEFT BEFORE DISCHARGE | End: 2019-11-24
Attending: EMERGENCY MEDICINE
Payer: MEDICAID

## 2019-11-24 VITALS
HEART RATE: 78 BPM | WEIGHT: 190 LBS | BODY MASS INDEX: 30.53 KG/M2 | SYSTOLIC BLOOD PRESSURE: 145 MMHG | HEIGHT: 66 IN | DIASTOLIC BLOOD PRESSURE: 88 MMHG | OXYGEN SATURATION: 98 % | RESPIRATION RATE: 18 BRPM | TEMPERATURE: 99 F

## 2019-11-24 DIAGNOSIS — R05.9 COUGH: ICD-10-CM

## 2019-11-24 LAB
CTP QC/QA: YES
CTP QC/QA: YES
POC MOLECULAR INFLUENZA A AGN: NEGATIVE
POC MOLECULAR INFLUENZA B AGN: NEGATIVE
S PYO RRNA THROAT QL PROBE: NEGATIVE

## 2019-11-24 PROCEDURE — 87081 CULTURE SCREEN ONLY: CPT

## 2019-11-24 PROCEDURE — 87880 STREP A ASSAY W/OPTIC: CPT | Mod: ER

## 2019-11-24 PROCEDURE — 87804 INFLUENZA ASSAY W/OPTIC: CPT | Mod: ER

## 2019-11-24 PROCEDURE — 99283 EMERGENCY DEPT VISIT LOW MDM: CPT | Mod: 25,ER

## 2019-11-24 PROCEDURE — 87502 INFLUENZA DNA AMP PROBE: CPT | Mod: ER

## 2019-11-26 LAB — BACTERIA THROAT CULT: NORMAL

## 2020-02-03 ENCOUNTER — HOSPITAL ENCOUNTER (EMERGENCY)
Facility: HOSPITAL | Age: 40
Discharge: HOME | End: 2020-02-03
Attending: EMERGENCY MEDICINE
Payer: MEDICAID

## 2020-02-03 VITALS
TEMPERATURE: 98 F | HEIGHT: 66 IN | WEIGHT: 190 LBS | RESPIRATION RATE: 14 BRPM | OXYGEN SATURATION: 100 % | DIASTOLIC BLOOD PRESSURE: 71 MMHG | HEART RATE: 89 BPM | BODY MASS INDEX: 30.53 KG/M2 | SYSTOLIC BLOOD PRESSURE: 124 MMHG

## 2020-02-03 DIAGNOSIS — R10.9 ACUTE ABDOMINAL PAIN: Primary | ICD-10-CM

## 2020-02-03 LAB
ALBUMIN SERPL-MCNC: 3.6 G/DL (ref 3.3–5.5)
ALP SERPL-CCNC: 49 U/L (ref 42–141)
B-HCG UR QL: NEGATIVE
BILIRUB SERPL-MCNC: 0.5 MG/DL (ref 0.2–1.6)
BILIRUBIN, POC UA: NEGATIVE
BLOOD, POC UA: NEGATIVE
BUN SERPL-MCNC: 9 MG/DL (ref 7–22)
CALCIUM SERPL-MCNC: 8.9 MG/DL (ref 8–10.3)
CHLORIDE SERPL-SCNC: 106 MMOL/L (ref 98–108)
CLARITY, POC UA: CLEAR
COLOR, POC UA: YELLOW
CREAT SERPL-MCNC: 0.8 MG/DL (ref 0.6–1.2)
CTP QC/QA: YES
GLUCOSE SERPL-MCNC: 107 MG/DL (ref 73–118)
GLUCOSE, POC UA: NEGATIVE
KETONES, POC UA: NEGATIVE
LEUKOCYTE EST, POC UA: NEGATIVE
NITRITE, POC UA: NEGATIVE
PH UR STRIP: 6.5 [PH]
POC ALT (SGPT): 21 U/L (ref 10–47)
POC AST (SGOT): 24 U/L (ref 11–38)
POC TCO2: 28 MMOL/L (ref 18–33)
POTASSIUM BLD-SCNC: 4.5 MMOL/L (ref 3.6–5.1)
PROTEIN, POC UA: NEGATIVE
PROTEIN, POC: 7.2 G/DL (ref 6.4–8.1)
SODIUM BLD-SCNC: 137 MMOL/L (ref 128–145)
SPECIFIC GRAVITY, POC UA: 1.02
UROBILINOGEN, POC UA: 0.2 E.U./DL

## 2020-02-03 PROCEDURE — 80053 COMPREHEN METABOLIC PANEL: CPT | Mod: ER

## 2020-02-03 PROCEDURE — 99284 EMERGENCY DEPT VISIT MOD MDM: CPT | Mod: 25,ER

## 2020-02-03 PROCEDURE — 81003 URINALYSIS AUTO W/O SCOPE: CPT | Mod: ER

## 2020-02-03 PROCEDURE — 81025 URINE PREGNANCY TEST: CPT | Mod: ER | Performed by: EMERGENCY MEDICINE

## 2020-02-03 PROCEDURE — 85025 COMPLETE CBC W/AUTO DIFF WBC: CPT | Mod: ER

## 2020-02-03 RX ORDER — CIPROFLOXACIN 500 MG/1
500 TABLET ORAL 2 TIMES DAILY
Qty: 20 TABLET | Refills: 0 | Status: SHIPPED | OUTPATIENT
Start: 2020-02-03 | End: 2020-02-13

## 2020-02-03 RX ORDER — METRONIDAZOLE 500 MG/1
500 TABLET ORAL 3 TIMES DAILY
Qty: 30 TABLET | Refills: 0 | Status: SHIPPED | OUTPATIENT
Start: 2020-02-03 | End: 2020-02-13

## 2020-02-03 NOTE — DISCHARGE INSTRUCTIONS
Follow up with your doctor for repeat imaging of abnormal spot seen on your liver. You will need to have more testing done.    Thank you for coming to our Emergency Department today. It is important to remember that some problems are difficult to diagnose and may not be found during your first visit. Be sure to follow up with your primary care doctor and review any labs/imaging that was performed with them. If you do not have a primary care doctor, you may contact the one listed on your discharge paperwork or you may also call the [facility] Appointment Desk at [phone] to schedule an appointment with one.     All medications may potentially have side effects and it is impossible to predict which medications may give you side effects. If you feel that you are having a negative effect of any medication you should immediately stop taking them and seek medical attention.    Return to the ER with any questions/concerns, new/concerning symptoms, worsening or failure to improve. Do not drive or make any important decisions for 24 hours if you have received any pain medications, sedatives or mood altering drugs during your ER visit.

## 2020-02-03 NOTE — ED TRIAGE NOTES
39 y.o. Female to ED with c.o. Diverticulitis flare up. Patient reports she has had one previous flare up in August 2019. Patient reports she had diarrhea yesterday and when she woke up this morning she had moderate abdominal pain. Patient endorses small amount of blood in diarrhea. Patient denies n/v, fever/chills, chest pain/ shortness of breath.

## 2020-02-03 NOTE — ED PROVIDER NOTES
Encounter Date: 2/3/2020       History     Chief Complaint   Patient presents with    Diverticulitis      STARTED LAST NIGHT.   THOUGHT SHE HAD EXCESS GAS.  BUT IT IS A LOT WORSE PAIN.      39 y.o. female History reviewed. No pertinent past medical history.     Diverticulitis s/p perforation, presents with suprapubic pain and diarrhea with some blood in it. Notes that these are the same symptoms she had with diverticulitis previous. Denies f/c, n/v, dysuria/retention.        Review of patient's allergies indicates:  No Known Allergies  History reviewed. No pertinent past medical history.  Past Surgical History:   Procedure Laterality Date     SECTION      x2    TUBAL LIGATION       Family History   Problem Relation Age of Onset    No Known Problems Mother     Hypertension Father     No Known Problems Sister     No Known Problems Brother      Social History     Tobacco Use    Smoking status: Former Smoker     Types: Cigarettes     Last attempt to quit: 6/10/2019     Years since quittin.6    Smokeless tobacco: Never Used   Substance Use Topics    Alcohol use: Yes     Comment: occassionally    Drug use: No     Review of Systems   Constitutional: Negative for fever.   HENT: Negative for sore throat.    Respiratory: Negative for shortness of breath.    Cardiovascular: Negative for chest pain.   Gastrointestinal: Positive for abdominal pain and diarrhea. Negative for nausea.   Genitourinary: Negative for dysuria.   Musculoskeletal: Negative for back pain.   Skin: Negative for rash.   Neurological: Negative for weakness.   Hematological: Does not bruise/bleed easily.   All other systems reviewed and are negative.      Physical Exam     Initial Vitals [20 1316]   BP Pulse Resp Temp SpO2   133/86 96 18 97.8 °F (36.6 °C) 98 %      MAP       --         Physical Exam    Nursing note and vitals reviewed.  Constitutional: She appears well-developed and well-nourished.   HENT:   Head: Normocephalic and  atraumatic.   Eyes: Conjunctivae and EOM are normal. Pupils are equal, round, and reactive to light.   Neck: Normal range of motion.   Cardiovascular: Normal rate.   Pulmonary/Chest: No respiratory distress.   Abdominal: Soft. She exhibits no distension. There is no tenderness. There is no rebound and no guarding.   Musculoskeletal: Normal range of motion.   Neurological: She is alert. No cranial nerve deficit. GCS score is 15. GCS eye subscore is 4. GCS verbal subscore is 5. GCS motor subscore is 6.   Skin: Skin is warm and dry.   Psychiatric: She has a normal mood and affect. Thought content normal.     mild suprapubic discomfort to palpation    ED Course   Procedures  Labs Reviewed - No data to display       Imaging Results    None                                   plan: ct, screening labs, ua , pvr.      Clinical Impression:       ICD-10-CM ICD-9-CM   1. Acute abdominal pain R10.9 789.00     338.19                             Don Chase MD  02/03/20 1501

## 2021-04-15 ENCOUNTER — PATIENT MESSAGE (OUTPATIENT)
Dept: RESEARCH | Facility: HOSPITAL | Age: 41
End: 2021-04-15

## 2021-11-04 ENCOUNTER — HOSPITAL ENCOUNTER (EMERGENCY)
Facility: HOSPITAL | Age: 41
Discharge: HOME | End: 2021-11-04
Attending: EMERGENCY MEDICINE
Payer: MEDICAID

## 2021-11-04 VITALS
HEART RATE: 72 BPM | WEIGHT: 185 LBS | BODY MASS INDEX: 29.73 KG/M2 | RESPIRATION RATE: 17 BRPM | DIASTOLIC BLOOD PRESSURE: 80 MMHG | HEIGHT: 66 IN | OXYGEN SATURATION: 100 % | TEMPERATURE: 98 F | SYSTOLIC BLOOD PRESSURE: 145 MMHG

## 2021-11-04 DIAGNOSIS — J02.9 EXUDATIVE PHARYNGITIS: Primary | ICD-10-CM

## 2021-11-04 LAB
B-HCG UR QL: NEGATIVE
CTP QC/QA: YES
CTP QC/QA: YES
INFLUENZA A ANTIGEN, POC: NEGATIVE
INFLUENZA B ANTIGEN, POC: NEGATIVE
POC RAPID STREP A: NEGATIVE
SARS-COV-2 RDRP RESP QL NAA+PROBE: NEGATIVE

## 2021-11-04 PROCEDURE — 81025 URINE PREGNANCY TEST: CPT | Mod: ER | Performed by: EMERGENCY MEDICINE

## 2021-11-04 PROCEDURE — U0002 COVID-19 LAB TEST NON-CDC: HCPCS | Mod: ER | Performed by: EMERGENCY MEDICINE

## 2021-11-04 PROCEDURE — 87804 INFLUENZA ASSAY W/OPTIC: CPT | Mod: ER

## 2021-11-04 PROCEDURE — 99284 EMERGENCY DEPT VISIT MOD MDM: CPT | Mod: ER

## 2021-11-04 RX ORDER — AMOXICILLIN AND CLAVULANATE POTASSIUM 875; 125 MG/1; MG/1
1 TABLET, FILM COATED ORAL 2 TIMES DAILY
Qty: 20 TABLET | Refills: 0 | Status: SHIPPED | OUTPATIENT
Start: 2021-11-04 | End: 2021-11-14

## 2021-11-04 RX ORDER — FLUTICASONE PROPIONATE 50 MCG
1 SPRAY, SUSPENSION (ML) NASAL 2 TIMES DAILY
Qty: 16 G | Refills: 0 | Status: SHIPPED | OUTPATIENT
Start: 2021-11-04 | End: 2023-11-07

## 2021-11-04 RX ORDER — IBUPROFEN 600 MG/1
600 TABLET ORAL EVERY 6 HOURS PRN
Qty: 20 TABLET | Refills: 0 | Status: SHIPPED | OUTPATIENT
Start: 2021-11-04 | End: 2023-11-07

## 2021-11-04 RX ORDER — LORATADINE 10 MG/1
10 TABLET ORAL DAILY
Qty: 60 TABLET | Refills: 0 | Status: SHIPPED | OUTPATIENT
Start: 2021-11-04 | End: 2023-11-07

## 2021-11-04 RX ORDER — LIDOCAINE HYDROCHLORIDE 20 MG/ML
SOLUTION OROPHARYNGEAL
Qty: 60 ML | Refills: 0 | Status: SHIPPED | OUTPATIENT
Start: 2021-11-04 | End: 2023-11-07

## 2021-11-04 RX ORDER — ACETAMINOPHEN 500 MG
1000 TABLET ORAL EVERY 6 HOURS PRN
Qty: 30 TABLET | Refills: 0 | Status: SHIPPED | OUTPATIENT
Start: 2021-11-04 | End: 2023-11-07

## 2022-07-22 ENCOUNTER — HOSPITAL ENCOUNTER (EMERGENCY)
Facility: HOSPITAL | Age: 42
Discharge: HOME | End: 2022-07-22
Attending: EMERGENCY MEDICINE
Payer: MEDICAID

## 2022-07-22 VITALS
SYSTOLIC BLOOD PRESSURE: 130 MMHG | BODY MASS INDEX: 30.53 KG/M2 | TEMPERATURE: 98 F | HEART RATE: 70 BPM | HEIGHT: 66 IN | RESPIRATION RATE: 14 BRPM | OXYGEN SATURATION: 98 % | DIASTOLIC BLOOD PRESSURE: 88 MMHG | WEIGHT: 190 LBS

## 2022-07-22 DIAGNOSIS — L03.019 PARONYCHIA OF THUMB, UNSPECIFIED LATERALITY: Primary | ICD-10-CM

## 2022-07-22 PROCEDURE — 99284 EMERGENCY DEPT VISIT MOD MDM: CPT | Mod: ER

## 2022-07-22 RX ORDER — FLUCONAZOLE 150 MG/1
150 TABLET ORAL ONCE
Qty: 1 TABLET | Refills: 0 | Status: SHIPPED | OUTPATIENT
Start: 2022-07-22 | End: 2022-07-22

## 2022-07-22 RX ORDER — CLINDAMYCIN HYDROCHLORIDE 150 MG/1
300 CAPSULE ORAL 4 TIMES DAILY
Qty: 80 CAPSULE | Refills: 0 | Status: SHIPPED | OUTPATIENT
Start: 2022-07-22 | End: 2022-08-01

## 2022-07-22 NOTE — ED PROVIDER NOTES
Encounter Date: 2022       History     Chief Complaint   Patient presents with    Paronychia     Pt has a Paronychia to the thumb on her left hand      41-year-old  female who presents to the ED with reports of left thumb pain and concern for abscess.  She states that she drained some pus from her thumb prior to arrival.  Patient shows a picture on her cell phone.  Denies any other complaints or concerns at this time.  Patient is requesting antibiotics  And Diflucan to prevent a yeast infection after antibiotics.        Review of patient's allergies indicates:  No Known Allergies  No past medical history on file.  Past Surgical History:   Procedure Laterality Date     SECTION      x2    TUBAL LIGATION       Family History   Problem Relation Age of Onset    No Known Problems Mother     Hypertension Father     No Known Problems Sister     No Known Problems Brother      Social History     Tobacco Use    Smoking status: Former Smoker     Types: Cigarettes     Quit date: 6/10/2019     Years since quitting: 3.1    Smokeless tobacco: Never Used   Substance Use Topics    Alcohol use: Yes     Comment: occassionally    Drug use: No     Review of Systems   Constitutional: Negative for fever.   HENT: Negative for congestion and sore throat.    Respiratory: Negative for shortness of breath.    Cardiovascular: Negative for chest pain.   Gastrointestinal: Negative for nausea.   Genitourinary: Negative for dysuria.   Musculoskeletal: Negative for arthralgias, back pain and myalgias.   Skin: Positive for wound. Negative for rash.   Neurological: Negative for weakness.   Hematological: Does not bruise/bleed easily.   All other systems reviewed and are negative.      Physical Exam     Initial Vitals [22 1046]   BP Pulse Resp Temp SpO2   130/88 70 14 97.8 °F (36.6 °C) 98 %      MAP       --         Physical Exam    Nursing note and vitals reviewed.  Constitutional: She appears well-developed and  well-nourished. She is not diaphoretic. No distress.   HENT:   Head: Normocephalic and atraumatic.   Eyes: EOM are normal. Pupils are equal, round, and reactive to light.   Neck: Neck supple.   Normal range of motion.  Cardiovascular: Normal rate, regular rhythm, normal heart sounds and intact distal pulses. Exam reveals no gallop and no friction rub.    No murmur heard.  Pulmonary/Chest: Breath sounds normal. No stridor. No respiratory distress. She has no wheezes. She has no rhonchi. She has no rales. She exhibits no tenderness.   Abdominal: Abdomen is soft. Bowel sounds are normal. She exhibits no distension and no mass. There is no abdominal tenderness. There is no rebound and no guarding.   Musculoskeletal:         General: Tenderness and edema present. Normal range of motion.        Hands:       Cervical back: Normal range of motion and neck supple.     Neurological: She is alert and oriented to person, place, and time. She has normal strength.   Skin: Skin is warm and dry.   Psychiatric: She has a normal mood and affect. Her behavior is normal. Thought content normal.         ED Course   Procedures  Labs Reviewed - No data to display       Imaging Results    None          Medications - No data to display  Medical Decision Making:   Initial Assessment:    41-year-old  female who presents to the ED with reports of left thumb pain and concern for abscess.   Differential Diagnosis:     Includes not limited to resolving paronychia with associated cellulitis versus deep tissue infection versus bony injury.  ED Management:    Exam consistent with resolving paronychia with associated cellulitis.  Patient declining any further I&D at this time.  She is requesting antibiotics and Diflucan for history of having yeast infections while taking antibiotics.  Will discharge with antibiotic coverage along with Diflucan prescription.  Will also discharge with outpatient follow-up and return precautions.  Patient  verbalized understanding agrees plan of care.    11:11 AM 7/22/2022  Morgan Nixon MD          DISCLAIMER: This note was prepared with [employer] voice recognition transcription software. Garbled syntax, mangled pronouns, and other bizarre constructions may be attributed to that software system                         Clinical Impression:   Final diagnoses:  [L03.019] Paronychia of thumb, unspecified laterality (Primary)          ED Disposition Condition    Discharge Stable        ED Prescriptions     Medication Sig Dispense Start Date End Date Auth. Provider    clindamycin (CLEOCIN) 150 MG capsule Take 2 capsules (300 mg total) by mouth 4 (four) times daily. for 10 days 80 capsule 7/22/2022 8/1/2022 Morgan Nixon MD    fluconazole (DIFLUCAN) 150 MG Tab (Expires today) Take 1 tablet (150 mg total) by mouth once. for 1 dose 1 tablet 7/22/2022 7/22/2022 Morgan Nixon MD        Follow-up Information     Follow up With Specialties Details Why Contact [facility] ED Emergency Medicine  As needed, If symptoms worsen [address] [facility] [id number] [phone]    [facility]  Schedule an appointment as soon as possible for a visit in 1 day Follow-up in 2 days for wound reassessment   In further management. [address] [facility] [address]  [phone]             Morgan Nixon MD  07/22/22 1111

## 2023-08-27 ENCOUNTER — HOSPITAL ENCOUNTER (EMERGENCY)
Facility: HOSPITAL | Age: 43
Discharge: HOME | End: 2023-08-27
Attending: EMERGENCY MEDICINE
Payer: MEDICAID

## 2023-08-27 VITALS
BODY MASS INDEX: 30.22 KG/M2 | DIASTOLIC BLOOD PRESSURE: 85 MMHG | RESPIRATION RATE: 18 BRPM | SYSTOLIC BLOOD PRESSURE: 114 MMHG | HEIGHT: 66 IN | WEIGHT: 188 LBS | TEMPERATURE: 98 F | OXYGEN SATURATION: 100 % | HEART RATE: 87 BPM

## 2023-08-27 DIAGNOSIS — K92.1 BLOOD IN STOOL: Primary | ICD-10-CM

## 2023-08-27 DIAGNOSIS — K57.92 DIVERTICULITIS: ICD-10-CM

## 2023-08-27 LAB
ALBUMIN SERPL-MCNC: 3.4 G/DL (ref 3.3–5.5)
ALBUMIN SERPL-MCNC: 3.5 G/DL (ref 3.3–5.5)
ALP SERPL-CCNC: 51 U/L (ref 42–141)
ALP SERPL-CCNC: 58 U/L (ref 42–141)
BILIRUB SERPL-MCNC: 0.5 MG/DL (ref 0.2–1.6)
BILIRUB SERPL-MCNC: 0.5 MG/DL (ref 0.2–1.6)
BILIRUBIN, POC UA: NEGATIVE
BLOOD, POC UA: ABNORMAL
BUN SERPL-MCNC: 12 MG/DL (ref 7–22)
CALCIUM SERPL-MCNC: 8.8 MG/DL (ref 8–10.3)
CHLORIDE SERPL-SCNC: 110 MMOL/L (ref 98–108)
CLARITY, POC UA: CLEAR
COLOR, POC UA: YELLOW
CREAT SERPL-MCNC: 0.8 MG/DL (ref 0.6–1.2)
GLUCOSE SERPL-MCNC: 95 MG/DL (ref 73–118)
GLUCOSE, POC UA: NEGATIVE
KETONES, POC UA: ABNORMAL
LEUKOCYTE EST, POC UA: NEGATIVE
NITRITE, POC UA: NEGATIVE
PH UR STRIP: 5.5 [PH]
POC ALT (SGPT): 24 U/L (ref 10–47)
POC ALT (SGPT): 28 U/L (ref 10–47)
POC AMYLASE: 27 U/L (ref 14–97)
POC AST (SGOT): 19 U/L (ref 11–38)
POC AST (SGOT): 24 U/L (ref 11–38)
POC GGT: 15 U/L (ref 5–65)
POC PTINR: 1.2 (ref 0.9–1.2)
POC PTWBT: 13.8 SEC (ref 9.7–14.3)
POC TCO2: 27 MMOL/L (ref 18–33)
POTASSIUM BLD-SCNC: 3.8 MMOL/L (ref 3.6–5.1)
PROTEIN, POC UA: ABNORMAL
PROTEIN, POC: 7 G/DL (ref 6.4–8.1)
PROTEIN, POC: 7.1 G/DL (ref 6.4–8.1)
SAMPLE: NORMAL
SODIUM BLD-SCNC: 142 MMOL/L (ref 128–145)
SPECIFIC GRAVITY, POC UA: >=1.03
UROBILINOGEN, POC UA: 0.2 E.U./DL

## 2023-08-27 PROCEDURE — 96360 HYDRATION IV INFUSION INIT: CPT | Mod: ER

## 2023-08-27 PROCEDURE — 80053 COMPREHEN METABOLIC PANEL: CPT | Mod: ER

## 2023-08-27 PROCEDURE — 99285 EMERGENCY DEPT VISIT HI MDM: CPT | Mod: 25,ER

## 2023-08-27 PROCEDURE — 82040 ASSAY OF SERUM ALBUMIN: CPT | Mod: 59,ER

## 2023-08-27 PROCEDURE — 25000003 PHARM REV CODE 250: Mod: ER | Performed by: PHYSICIAN ASSISTANT

## 2023-08-27 PROCEDURE — 82150 ASSAY OF AMYLASE: CPT | Mod: ER

## 2023-08-27 PROCEDURE — 25500020 PHARM REV CODE 255: Mod: ER | Performed by: EMERGENCY MEDICINE

## 2023-08-27 RX ORDER — FLUCONAZOLE 200 MG/1
TABLET ORAL
Qty: 2 TABLET | Refills: 0 | Status: SHIPPED | OUTPATIENT
Start: 2023-08-27 | End: 2023-09-11 | Stop reason: HOSPADM

## 2023-08-27 RX ORDER — CIPROFLOXACIN 500 MG/1
500 TABLET ORAL 2 TIMES DAILY
Qty: 14 TABLET | Refills: 0 | Status: SHIPPED | OUTPATIENT
Start: 2023-08-27 | End: 2023-09-03

## 2023-08-27 RX ORDER — METRONIDAZOLE 500 MG/1
500 TABLET ORAL 3 TIMES DAILY
Qty: 21 TABLET | Refills: 0 | Status: SHIPPED | OUTPATIENT
Start: 2023-08-27 | End: 2023-09-03

## 2023-08-27 RX ADMIN — SODIUM CHLORIDE 1000 ML: 9 INJECTION, SOLUTION INTRAVENOUS at 05:08

## 2023-08-27 RX ADMIN — IOHEXOL 100 ML: 350 INJECTION, SOLUTION INTRAVENOUS at 06:08

## 2023-08-27 NOTE — DISCHARGE INSTRUCTIONS
Take the prescribed antibiotics for diverticulitis. Contact your GI doctor about this episode. Return to ER for any concerns or worsening symptoms.

## 2023-08-27 NOTE — ED PROVIDER NOTES
"Encounter Date: 2023    SCRIBE #1 NOTE: I, Venkat Ramirez, am scribing for, and in the presence of,  Katalina Quinones MD. I have scribed the following portions of the note - Other sections scribed: HPI,ROS,PE.       History     Chief Complaint   Patient presents with    Abdominal Pain     Lower quadrant abdominal pain, reports PMH of diverticulitis with similar symptoms as previous episode.      Kwasi Arias is a 42 y.o. female, with a PMHx of Diverticulitis, who presents to the ED with bloody stool that began "5-6 weeks ago". Patient reports having constipation from ozempic and reports that due to the straining she had a large Hemorrhoid that was bleeding. Patient further reports that she had a tear in her anus that exacerbated her symptoms. Patient reports that the Hemorrhoid has healed and that the anal fissure is gone but reports that she is still having bright red bloody stool. Patient states that the blood is inside of the stool not just outside of it. Patient states that she is worried about having another episode of diverticulitis which is what brought her into the ED today. Patient endorses daily compliance with venlafaxine and stool softeners.  Patient denies being on anticoagulants. No other exacerbating or alleviating factors. Denies vaginal bleeding, easy bruising, change in appetite or other associated symptoms.      The history is provided by the patient. No  was used.     Review of patient's allergies indicates:  No Known Allergies  History reviewed. No pertinent past medical history.  Past Surgical History:   Procedure Laterality Date     SECTION      x2    TUBAL LIGATION       Family History   Problem Relation Age of Onset    No Known Problems Mother     Hypertension Father     No Known Problems Sister     No Known Problems Brother      Social History     Tobacco Use    Smoking status: Former     Current packs/day: 0.00     Types: Cigarettes     Quit date: " 6/10/2019     Years since quittin.2    Smokeless tobacco: Never   Substance Use Topics    Alcohol use: Yes     Comment: occassionally    Drug use: No     Review of Systems   Constitutional:  Negative for activity change, appetite change, chills and fever.   HENT:  Negative for congestion, rhinorrhea, sneezing and sore throat.    Respiratory:  Negative for cough, choking, shortness of breath and wheezing.    Cardiovascular:  Negative for chest pain and palpitations.   Gastrointestinal:  Positive for blood in stool. Negative for abdominal pain, diarrhea, nausea and vomiting.   Genitourinary:  Negative for vaginal bleeding.   Neurological:  Negative for dizziness, syncope, light-headedness and headaches.   Hematological:  Does not bruise/bleed easily.   All other systems reviewed and are negative.      Physical Exam     Initial Vitals [23 1612]   BP Pulse Resp Temp SpO2   138/79 86 18 97.9 °F (36.6 °C) 100 %      MAP       --         Physical Exam    Nursing note and vitals reviewed.  Constitutional: She appears well-developed and well-nourished. No distress.   HENT:   Head: Normocephalic and atraumatic.   Eyes: Conjunctivae are normal.   Neck:   Normal range of motion.  Cardiovascular:  Normal rate, regular rhythm and normal heart sounds.           No murmur heard.  Pulmonary/Chest: Breath sounds normal. No respiratory distress.   Abdominal: Abdomen is soft. Bowel sounds are normal. She exhibits no distension and no mass. There is no abdominal tenderness. There is no rebound and no guarding.   Musculoskeletal:         General: No tenderness or edema. Normal range of motion.      Cervical back: Normal range of motion.     Neurological: She is alert and oriented to person, place, and time. GCS score is 15. GCS eye subscore is 4. GCS verbal subscore is 5. GCS motor subscore is 6.   Skin: Skin is warm and dry.   Psychiatric: She has a normal mood and affect. Her behavior is normal.         ED Course    Procedures  Labs Reviewed   POCT URINALYSIS W/O SCOPE - Abnormal; Notable for the following components:       Result Value    Ketones, UA Trace (*)     Spec Grav UA >=1.030 (*)     Blood, UA Trace-intact (*)     Protein, UA Trace (*)     All other components within normal limits   POCT CMP - Abnormal; Notable for the following components:    POC Chloride 110 (*)     All other components within normal limits   POCT CBC   POCT URINALYSIS W/O SCOPE   POCT CMP   POCT LIVER PANEL   POCT PROTIME-INR   ISTAT PROCEDURE   POCT LIVER PANEL          Imaging Results               CT Abdomen Pelvis With Contrast (Final result)  Result time 08/27/23 18:52:03      Final result by Gianni Schuster MD (08/27/23 18:52:03)                   Impression:      1. Diverticulosis of the sigmoid colon with incomplete distension and possible mild wall thickening and minimal adjacent stranding of the distal sigmoid colon and colorectal junction, could represent mild acute colitis/diverticulitis.  Follow-up recommended.  Recommend clinical correlation.  2. Small possible hepatic hemangiomas.  Limited characterization.  Recommend surveillance.  3. This report was flagged in Epic as abnormal.      Electronically signed by: Gianni Schuster  Date:    08/27/2023  Time:    18:52               Narrative:    EXAMINATION:  CT ABDOMEN PELVIS WITH CONTRAST    CLINICAL HISTORY:  LLQ abdominal pain;    TECHNIQUE:  Low dose axial images, sagittal and coronal reformations were obtained from the lung bases to the pubic symphysis following the IV administration of 100 mL of Omnipaque 350 .  Oral contrast was not administered.    COMPARISON:  02/03/2020, 08/10/2019    FINDINGS:  Abdomen:    - Lower thorax:    - Lung bases: No infiltrates and no nodules.    - Liver: 1.9 cm right hepatic lobe mass with peripheral nodular enhancement suggesting a hepatic hemangioma.  Limited characterization.  Small 11 mm hypodense focus in the left lobe on axial 23 is  "nonspecific possibly a small hemangioma.    Small subcentimeter hypodense focus in the right lobe on axial 16 also.  Recommend surveillance.    - Gallbladder: No calcified gallstones.    - Bile Ducts: No evidence of intra or extra hepatic biliary ductal dilation.    - Spleen: Negative.    - Kidneys: No mass or hydronephrosis.    - Adrenals: Unremarkable.    - Pancreas: No mass or peripancreatic fat stranding.    - Retroperitoneum:  No significant adenopathy.    - Vascular: No abdominal aortic aneurysm.    - Abdominal wall:  Unremarkable.    The appendix is within normal limits.    Pelvis:    Urinary bladder is nondistended.    The uterus is present.  Small follicle in the right ovary.  No adnexal mass.  No significant free fluid in the pelvis.    Bowel/Mesentery:    No evidence of bowel obstruction.    There is diverticulosis of the sigmoid colon.  There is incomplete distension of the distal sigmoid colon and rectosigmoid junction.  Possible mild wall thickening.  There is possible minimal adjacent stranding on sagittal 53 and 54.  Findings could represent mild acute colitis/diverticulitis.  Recommend follow-up.    Bones:  No acute osseous abnormality and no suspicious lytic or blastic lesion.                                       Medications   sodium chloride 0.9% bolus 1,000 mL 1,000 mL (1,000 mLs Intravenous New Bag 8/27/23 4784)   iohexoL (OMNIPAQUE 350) injection 100 mL (100 mLs Intravenous Given 8/27/23 1806)     Medical Decision Making  Kwasi Arias is a 42 y.o. female, with bloody stool that began "5-6 weeks ago". On exam abdomen is soft and non-tender. In shared decision making with the patient, labs and CT of abdomen/pelvis ordered. I considered but excluded anemia, thrombocytopenia, sepsis, UTI, bowel obstruction, bowel perforation, colon mass. CT does show diverticulitis - will treat with with cipro/flagyl. Follow up with GI.    Amount and/or Complexity of Data Reviewed  Labs: " ordered.  Radiology: ordered.    Risk  Prescription drug management.            Scribe Attestation:   Scribe #1: I performed the above scribed service and the documentation accurately describes the services I performed. I attest to the accuracy of the note.              I, Dr. Katalina Quinones, personally performed the services described in this documentation.   All medical record entries made by the scribe were at my direction and in my presence.   I have reviewed the chart and agree that the record is accurate and complete.   Katalina Quinones MD.  5:09 PM 08/27/2023            Clinical Impression:   Final diagnoses:  [K92.1] Blood in stool (Primary)  [K57.92] Diverticulitis        ED Disposition Condition    Discharge Stable          ED Prescriptions       Medication Sig Dispense Start Date End Date Auth. Provider    ciprofloxacin HCl (CIPRO) 500 MG tablet Take 1 tablet (500 mg total) by mouth 2 (two) times daily. for 7 days 14 tablet 8/27/2023 9/3/2023 Katalina Quinones MD    metroNIDAZOLE (FLAGYL) 500 MG tablet Take 1 tablet (500 mg total) by mouth 3 (three) times daily. for 7 days 21 tablet 8/27/2023 9/3/2023 Katalina Quinones MD    fluconazole (DIFLUCAN) 200 MG Tab Take 1 tablet on day 1, then 1 tablet on day 3. 2 tablet 8/27/2023 -- Katalina Quinones MD          Follow-up Information    None          Katalina Quinones MD  08/27/23 3647

## 2023-09-11 ENCOUNTER — HOSPITAL ENCOUNTER (EMERGENCY)
Facility: HOSPITAL | Age: 43
Discharge: HOME | End: 2023-09-11
Attending: EMERGENCY MEDICINE
Payer: MEDICAID

## 2023-09-11 VITALS
BODY MASS INDEX: 30.22 KG/M2 | HEIGHT: 66 IN | TEMPERATURE: 98 F | HEART RATE: 80 BPM | RESPIRATION RATE: 16 BRPM | DIASTOLIC BLOOD PRESSURE: 76 MMHG | SYSTOLIC BLOOD PRESSURE: 127 MMHG | OXYGEN SATURATION: 100 % | WEIGHT: 188 LBS

## 2023-09-11 DIAGNOSIS — R10.32 LEFT LOWER QUADRANT ABDOMINAL PAIN: ICD-10-CM

## 2023-09-11 DIAGNOSIS — K57.90 DIVERTICULOSIS: Primary | ICD-10-CM

## 2023-09-11 PROBLEM — N94.10 DYSPAREUNIA, FEMALE: Status: ACTIVE | Noted: 2022-03-02

## 2023-09-11 PROBLEM — R10.2 PELVIC PAIN IN FEMALE: Status: ACTIVE | Noted: 2022-03-02

## 2023-09-11 PROBLEM — N80.9 ENDOMETRIOSIS: Status: ACTIVE | Noted: 2022-05-17

## 2023-09-11 PROBLEM — Z82.49 FHX: CORONARY ARTERY DISEASE: Status: ACTIVE | Noted: 2021-12-30

## 2023-09-11 PROBLEM — Z87.19 HX OF DIVERTICULITIS OF COLON: Status: ACTIVE | Noted: 2021-12-30

## 2023-09-11 PROBLEM — R09.82 POST-NASAL DRIP: Status: ACTIVE | Noted: 2022-09-19

## 2023-09-11 PROBLEM — N64.52 NIPPLE DISCHARGE IN FEMALE: Status: ACTIVE | Noted: 2018-06-18

## 2023-09-11 PROBLEM — F41.9 MODERATE ANXIETY: Status: ACTIVE | Noted: 2022-09-19

## 2023-09-11 LAB
ALBUMIN SERPL-MCNC: 3.9 G/DL (ref 3.3–5.5)
ALBUMIN SERPL-MCNC: 4 G/DL (ref 3.3–5.5)
ALP SERPL-CCNC: 44 U/L (ref 42–141)
ALP SERPL-CCNC: 47 U/L (ref 42–141)
B-HCG UR QL: NEGATIVE
BILIRUB SERPL-MCNC: 0.4 MG/DL (ref 0.2–1.6)
BILIRUB SERPL-MCNC: 0.5 MG/DL (ref 0.2–1.6)
BILIRUBIN, POC UA: NEGATIVE
BLOOD, POC UA: NEGATIVE
BUN SERPL-MCNC: 10 MG/DL (ref 7–22)
CALCIUM SERPL-MCNC: 9.5 MG/DL (ref 8–10.3)
CHLORIDE SERPL-SCNC: 107 MMOL/L (ref 98–108)
CLARITY, POC UA: CLEAR
COLOR, POC UA: YELLOW
CREAT SERPL-MCNC: 0.7 MG/DL (ref 0.6–1.2)
CTP QC/QA: YES
GLUCOSE SERPL-MCNC: 91 MG/DL (ref 73–118)
GLUCOSE, POC UA: NEGATIVE
KETONES, POC UA: NEGATIVE
LEUKOCYTE EST, POC UA: NEGATIVE
NITRITE, POC UA: NEGATIVE
PH UR STRIP: 6 [PH]
POC ALT (SGPT): 15 U/L (ref 10–47)
POC ALT (SGPT): 23 U/L (ref 10–47)
POC AMYLASE: 28 U/L (ref 14–97)
POC AST (SGOT): 20 U/L (ref 11–38)
POC AST (SGOT): 23 U/L (ref 11–38)
POC GGT: 11 U/L (ref 5–65)
POC TCO2: 28 MMOL/L (ref 18–33)
POTASSIUM BLD-SCNC: 4.2 MMOL/L (ref 3.6–5.1)
PROTEIN, POC UA: NEGATIVE
PROTEIN, POC: 7.1 G/DL (ref 6.4–8.1)
PROTEIN, POC: 7.3 G/DL (ref 6.4–8.1)
SODIUM BLD-SCNC: 139 MMOL/L (ref 128–145)
SPECIFIC GRAVITY, POC UA: 1.02
UROBILINOGEN, POC UA: 0.2 E.U./DL

## 2023-09-11 PROCEDURE — 96374 THER/PROPH/DIAG INJ IV PUSH: CPT | Mod: ER

## 2023-09-11 PROCEDURE — 81025 URINE PREGNANCY TEST: CPT | Mod: ER | Performed by: NURSE PRACTITIONER

## 2023-09-11 PROCEDURE — 96375 TX/PRO/DX INJ NEW DRUG ADDON: CPT | Mod: ER

## 2023-09-11 PROCEDURE — 25500020 PHARM REV CODE 255: Mod: ER

## 2023-09-11 PROCEDURE — 25000003 PHARM REV CODE 250: Mod: ER | Performed by: NURSE PRACTITIONER

## 2023-09-11 PROCEDURE — 96361 HYDRATE IV INFUSION ADD-ON: CPT | Mod: ER

## 2023-09-11 PROCEDURE — 80053 COMPREHEN METABOLIC PANEL: CPT | Mod: ER

## 2023-09-11 PROCEDURE — 99285 EMERGENCY DEPT VISIT HI MDM: CPT | Mod: 25,ER

## 2023-09-11 PROCEDURE — 81025 URINE PREGNANCY TEST: CPT | Mod: ER

## 2023-09-11 PROCEDURE — 82040 ASSAY OF SERUM ALBUMIN: CPT | Mod: 59,ER

## 2023-09-11 PROCEDURE — 63600175 PHARM REV CODE 636 W HCPCS: Mod: ER | Performed by: NURSE PRACTITIONER

## 2023-09-11 PROCEDURE — 82150 ASSAY OF AMYLASE: CPT | Mod: ER

## 2023-09-11 RX ORDER — KETOROLAC TROMETHAMINE 30 MG/ML
15 INJECTION, SOLUTION INTRAMUSCULAR; INTRAVENOUS
Status: COMPLETED | OUTPATIENT
Start: 2023-09-11 | End: 2023-09-11

## 2023-09-11 RX ORDER — METRONIDAZOLE 500 MG/1
500 TABLET ORAL 3 TIMES DAILY
Qty: 15 TABLET | Refills: 0 | Status: SHIPPED | OUTPATIENT
Start: 2023-09-11 | End: 2023-09-16

## 2023-09-11 RX ORDER — CIPROFLOXACIN 500 MG/1
500 TABLET ORAL 2 TIMES DAILY
Qty: 10 TABLET | Refills: 0 | Status: SHIPPED | OUTPATIENT
Start: 2023-09-11 | End: 2023-09-16

## 2023-09-11 RX ORDER — ONDANSETRON 2 MG/ML
4 INJECTION INTRAMUSCULAR; INTRAVENOUS
Status: COMPLETED | OUTPATIENT
Start: 2023-09-11 | End: 2023-09-11

## 2023-09-11 RX ORDER — OXYCODONE HYDROCHLORIDE 5 MG/1
5 TABLET ORAL EVERY 6 HOURS PRN
Qty: 10 TABLET | Refills: 0 | Status: SHIPPED | OUTPATIENT
Start: 2023-09-11 | End: 2023-11-07

## 2023-09-11 RX ADMIN — SODIUM CHLORIDE 1000 ML: 9 INJECTION, SOLUTION INTRAVENOUS at 05:09

## 2023-09-11 RX ADMIN — KETOROLAC TROMETHAMINE 15 MG: 30 INJECTION, SOLUTION INTRAMUSCULAR; INTRAVENOUS at 05:09

## 2023-09-11 RX ADMIN — ONDANSETRON 4 MG: 2 INJECTION INTRAMUSCULAR; INTRAVENOUS at 05:09

## 2023-09-11 RX ADMIN — IOHEXOL 100 ML: 350 INJECTION, SOLUTION INTRAVENOUS at 05:09

## 2023-09-11 NOTE — ED PROVIDER NOTES
Encounter Date: 2023    SCRIBE #1 NOTE: I, Nkechi Young, am scribing for, and in the presence of,  Carey Quach NP. I have scribed the following portions of the note - Other sections scribed: HPI, ROS.       History     Chief Complaint   Patient presents with    Abdominal Pain     A 41 y/o female, w/ Hx of Diverticulitis, presents to the ER c/o LLQ ABD pain since this morning. Pt reports being seen at ER  and prescribed medication. Denies CP, N/V     Kwasi Arias is a 42 y.o. female, with a PMHx of diverticulitis, who presents to the ED with LLQ abdominal pain that began this morning.  Patient additionally reports chills, loose stool, and difficulty urinating. Patient was seen at this facility 2023 for bloody stool 5-6 weeks when was diagnosied with Diverticulitis and treated with cipro/flagyl, of which patient endorses compliance with.  Reports symptoms did improve for about a week. No other exacerbating or alleviating factors. Denies bloody stool, fever, nausea, vomiting, constipation, vaginal bleeding, vaginal discharge, dysuria, back pain or other associated symptoms. Patient denies EtOH, cigarette, or illicit drug use. PSHx hysterectomy and 2  sections.         The history is provided by the patient. No  was used.     Review of patient's allergies indicates:  No Known Allergies  History reviewed. No pertinent past medical history.  Past Surgical History:   Procedure Laterality Date     SECTION      x2    TUBAL LIGATION       Family History   Problem Relation Age of Onset    No Known Problems Mother     Hypertension Father     No Known Problems Sister     No Known Problems Brother      Social History     Tobacco Use    Smoking status: Former     Current packs/day: 0.00     Types: Cigarettes     Quit date: 6/10/2019     Years since quittin.2    Smokeless tobacco: Never   Substance Use Topics    Alcohol use: Yes     Comment: occassionally    Drug  "use: No     Review of Systems   Constitutional:  Positive for chills. Negative for fever.   HENT:  Negative for congestion and sore throat.    Eyes:  Negative for visual disturbance.   Respiratory:  Negative for cough and shortness of breath.    Cardiovascular:  Negative for chest pain.   Gastrointestinal:  Positive for abdominal pain (LLQ). Negative for blood in stool, constipation, nausea and vomiting.        (+) loose stool   Genitourinary:  Positive for difficulty urinating ("cannot empty bladder"). Negative for dysuria, vaginal bleeding and vaginal discharge.   Musculoskeletal:  Negative for back pain.   Skin:  Negative for rash.   Neurological:  Negative for headaches.   Psychiatric/Behavioral:  Negative for decreased concentration.        Physical Exam     Initial Vitals [09/11/23 1614]   BP Pulse Resp Temp SpO2   119/78 87 18 97.5 °F (36.4 °C) 97 %      MAP       --         Physical Exam    Constitutional: She appears well-developed and well-nourished. She is not diaphoretic. No distress.   HENT:   Head: Normocephalic and atraumatic.   Neck:   Normal range of motion.  Cardiovascular:  Normal rate, regular rhythm, normal heart sounds and intact distal pulses.           Pulmonary/Chest: Breath sounds normal. No respiratory distress.   Abdominal: Abdomen is soft. Bowel sounds are normal. There is abdominal tenderness in the left lower quadrant.   Musculoskeletal:         General: Normal range of motion.      Cervical back: Normal range of motion.     Neurological: She is alert and oriented to person, place, and time.   Skin: Skin is warm and dry.   Psychiatric: She has a normal mood and affect. Her behavior is normal.         ED Course   Procedures  Labs Reviewed   POCT CBC   POCT URINALYSIS W/O SCOPE   POCT URINE PREGNANCY   POCT URINALYSIS W/O SCOPE   POCT CMP   POCT LIVER PANEL   POCT LIVER PANEL   POCT CMP            Imaging Results              CT Abdomen Pelvis With Contrast (Final result)  Result time " 09/11/23 18:49:31      Final result by Gianni Schuster MD (09/11/23 18:49:31)                   Impression:      1. Diverticulosis of the sigmoid colon with incomplete distension.  Minimal wall thickening could not be excluded.  Recommend clinical correlation.  2. 3 small hypodense lesions in the liver favored to represent a benign process, likely hemangiomas.  See above comments.  Recommend surveillance.  3. Possible 6 mm pulmonary nodule or nodular infiltrate incompletely visualized on image number 1 in the right middle lobe.  Recommend surveillance.      Electronically signed by: Gianni Schuster  Date:    09/11/2023  Time:    18:49               Narrative:    EXAMINATION:  CT ABDOMEN PELVIS WITH CONTRAST    CLINICAL HISTORY:  LLQ abdominal pain;    TECHNIQUE:  Low dose axial images, sagittal and coronal reformations were obtained from the lung bases to the pubic symphysis following the IV administration of 100 mL of Omnipaque 350 .  Oral contrast was not administered.    COMPARISON:  08/27/2023, 02/03/2020, 08/10/2019    FINDINGS:  Abdomen:    - Lower thorax:    - Lung bases: Possible 6 mm pulmonary nodule or nodular infiltrate incompletely visualized on image number 1 in the right middle lobe.  Recommend surveillance.    For a solid nodule 6-8 mm, Fleischner Society 2017 guidelines recommend follow up with non-contrast chest CT at 6-12 months and 18-24 months after discovery.    - Liver: There are 3 small hypodense lesions in the liver in the left and right lobes on axial 24, 26 and 16.  No significant change from the recent prior study.  This is similar to the study 10 19 also.  Possible small hemangiomas though nonspecific.  No detrimental change.    - Gallbladder: No calcified gallstones.    - Bile Ducts: No evidence of intra or extra hepatic biliary ductal dilation.    - Spleen: Negative.    - Kidneys: No mass or hydronephrosis.    - Adrenals: Unremarkable.    - Pancreas: No mass or peripancreatic fat  stranding.    - Retroperitoneum:  No significant adenopathy.    - Vascular: No abdominal aortic aneurysm.    - Abdominal wall:  Unremarkable.    Pelvis:    Urinary bladder is within normal limits.    The uterus is midline.  No adnexal mass.    The appendix is within normal limits.    Bowel/Mesentery:    No evidence of bowel obstruction.  There is diverticulosis of the sigmoid colon with incomplete distension.  Minimal wall thickening of the sigmoid colon can not be excluded.    No evidence of abscess or perforation.    Moderate retained feces in the colon.    Bones:  No acute osseous abnormality and no suspicious lytic or blastic lesion.                                       Medications   sodium chloride 0.9% bolus 1,000 mL 1,000 mL (0 mLs Intravenous Stopped 9/11/23 1840)   ketorolac injection 15 mg (15 mg Intravenous Given 9/11/23 1727)   ondansetron injection 4 mg (4 mg Intravenous Given 9/11/23 1727)   iohexoL (OMNIPAQUE 350) 350 mg iodine/mL injection (100 mLs Intravenous Given 9/11/23 1728)     Medical Decision Making  42-year-old female with history of diverticulitis presenting to the ED for evaluation of left lower quadrant abdominal pain x1 day.  On exam, she is pleasant well-appearing.  Vital signs stable and reassuring.  Differentials include diverticulitis, diverticulitis with perforation or abscess, constipation, colitis, UTI, others.  She has mild tenderness of left lower quadrant without guarding or rigidity.  No CVA tenderness.  Afebrile.  Mild leukocytosis with a white blood cell count of 10.4.  Urinalysis without infection.  CMP reassuring.  Awaiting CT scan.    Patient signed out to RICHAR ARROYO who will dispo patient pending  CT scan, re-evaluation.  RICHAR Quach NP 1800    This is Trena Doherty PA-C dictating from here on.  Patient's CT scan resulted showing positive for diverticulosis with incomplete distention, and minimal wall thickening can not be excluded.  I reviewed these findings with the  patient.  I recommend that she start on antibiotics again along with a liquid diet until she was able to meet with her gastroenterologist tomorrow as scheduled.  At that time, she should review her CT scan and lab work findings from today and they may discuss further recommendations.  Until then, we will plan to restart antibiotics in case there was some lingering infection remaining given finding of minimal wall thickening as a possibility on her CT scan.  Patient is agreeable with this plan.  Cipro/Flagyl sent to pharmacy along with oxycodone to take as needed for pain.       Amount and/or Complexity of Data Reviewed  External Data Reviewed: notes.     Details: 8/27/2023 ED visit  Labs: ordered. Decision-making details documented in ED Course.  Radiology: ordered.    Risk  Prescription drug management.            Scribe Attestation:   Scribe #1: I performed the above scribed service and the documentation accurately describes the services I performed. I attest to the accuracy of the note.        ED Course as of 09/11/23 1907   Mon Sep 11, 2023   1715 POCT CBC  WBC 10.4 [MT]      ED Course User Index  [MT] Carey Quach, RICHAR GOMEZ, personally performed the services described in this documentation. All medical record entries made by the scribe were at my direction and in my presence.  I have reviewed the chart and agree that the record reflects my personal performance and is accurate and complete.    Clinical Impression:   Final diagnoses:  [K57.90] Diverticulosis (Primary)  [R10.32] Left lower quadrant abdominal pain        ED Disposition Condition    Discharge Stable          ED Prescriptions       Medication Sig Dispense Start Date End Date Auth. Provider    ciprofloxacin HCl (CIPRO) 500 MG tablet Take 1 tablet (500 mg total) by mouth 2 (two) times daily. for 5 days 10 tablet 9/11/2023 9/16/2023 Trena Doherty PA-C    metroNIDAZOLE (FLAGYL) 500 MG tablet Take 1 tablet (500 mg total)  by mouth 3 (three) times daily. for 5 days 15 tablet 9/11/2023 9/16/2023 Trnea Doherty PA-C    oxyCODONE (ROXICODONE) 5 MG immediate release tablet Take 1 tablet (5 mg total) by mouth every 6 (six) hours as needed for Pain. 10 tablet 9/11/2023 -- Trena Doherty PA-C          Follow-up Information       Follow up With Specialties Details Why Contact Info    [facility] Emergency Medicine Go to  As needed, If symptoms worsen [address] [id number] [phone]             Trena Doherty PA-C  09/11/23 9366

## 2023-09-12 NOTE — DISCHARGE INSTRUCTIONS
Please keep your appointment with your gastroenterologist for tomorrow as scheduled.  You may start the prescribed antibiotics in the ED liquid diet until you further discuss your symptoms with your GI doctor.  You may take the prescribed medication as needed for pain.

## 2023-11-06 NOTE — ED PROVIDER NOTES
Encounter Date: 2019    SCRIBE #1 NOTE: I, Jacinta Garcia, am scribing for, and in the presence of,  DINA Steward. I have scribed the following portions of the note - Other sections scribed: HPI, ROS, PE.       History     Chief Complaint   Patient presents with    Cough     Nasal and chest congestion, fever, chills, denies nausea, vomiting, diarrhea x 3 days     39 year old female complains of cough x 3 days. She notes nasal and chest congestion, fever, chills, chest pain only when coughing. She denies nausea, vomiting and diarrhea. She reports taking Bernarda Pembroke Cold Plus without relief. She notes she used to smoke but quit a few months ago.    The history is provided by the patient. No  was used.     Review of patient's allergies indicates:  No Known Allergies  No past medical history on file.  Past Surgical History:   Procedure Laterality Date     SECTION      x2    TUBAL LIGATION       Family History   Problem Relation Age of Onset    No Known Problems Mother     Hypertension Father     No Known Problems Sister     No Known Problems Brother      Social History     Tobacco Use    Smoking status: Former Smoker     Types: Cigarettes     Last attempt to quit: 6/10/2019     Years since quittin.4    Smokeless tobacco: Never Used   Substance Use Topics    Alcohol use: Yes     Comment: occassionally    Drug use: No     Review of Systems   Constitutional: Positive for chills and fever.   HENT: Positive for congestion.    Respiratory: Positive for cough.    Cardiovascular: Positive for chest pain.   Gastrointestinal: Negative for diarrhea, nausea and vomiting.   All other systems reviewed and are negative.      Physical Exam     Initial Vitals [19 1043]   BP Pulse Resp Temp SpO2   (!) 145/88 78 18 98.5 °F (36.9 °C) 98 %      MAP       --         Physical Exam    Nursing note and vitals reviewed.  Constitutional: She appears well-developed and well-nourished.    HENT:   Head: Normocephalic and atraumatic.   Eyes: Conjunctivae and EOM are normal. Pupils are equal, round, and reactive to light.   Neck: Normal range of motion. Neck supple.   Cardiovascular: Normal rate, normal heart sounds and intact distal pulses.   Pulmonary/Chest: Effort normal and breath sounds normal. No respiratory distress.   Abdominal: Soft.   Musculoskeletal: Normal range of motion.   Neurological: She is alert and oriented to person, place, and time.   Skin: Skin is warm and dry.   Psychiatric: She has a normal mood and affect.         ED Course   Procedures  Labs Reviewed   CULTURE, STREP A,  THROAT   POCT RAPID STREP A   POCT INFLUENZA A/B MOLECULAR          Imaging Results    None          Medical Decision Making:   History:   Old Medical Records: I decided to obtain old medical records.  Initial Assessment:   Patient has symptoms consistent with a viral URI. Lungs clear to auscultation on exam, hence I doubt pneumonia.  Chest x-ray ordered but patient eloped prior to getting x-ray.  Patient appears well hydrated and nontoxic clinically. Vitals stable.  No nuchal rigidity, nausea, vomiting, photophobia, or headache, therefore I doubt meningitis at this time.   Clinical Tests:   Lab Tests: Ordered and Reviewed  Radiological Study: Ordered and Reviewed            Scribe Attestation:   Scribe #1: I performed the above scribed service and the documentation accurately describes the services I performed. I attest to the accuracy of the note.     Ree Garcia PA-C                      Clinical Impression:     1. Cough            Disposition:   Disposition: Eloped                     DINA Easton  11/24/19 1063     DISCHARGE

## 2023-11-07 ENCOUNTER — ON-DEMAND VIRTUAL (OUTPATIENT)
Dept: URGENT CARE | Facility: CLINIC | Age: 43
End: 2023-11-07
Payer: MEDICAID

## 2023-11-07 DIAGNOSIS — H10.9 CONJUNCTIVITIS, UNSPECIFIED CONJUNCTIVITIS TYPE, UNSPECIFIED LATERALITY: Primary | ICD-10-CM

## 2023-11-07 PROCEDURE — 99203 PR OFFICE/OUTPT VISIT, NEW, LEVL III, 30-44 MIN: ICD-10-PCS | Mod: 95,,, | Performed by: NURSE PRACTITIONER

## 2023-11-07 PROCEDURE — 99203 OFFICE O/P NEW LOW 30 MIN: CPT | Mod: 95,,, | Performed by: NURSE PRACTITIONER

## 2023-11-07 RX ORDER — CIPROFLOXACIN HYDROCHLORIDE 3 MG/ML
1 SOLUTION/ DROPS OPHTHALMIC
Qty: 15 ML | Refills: 0 | Status: SHIPPED | OUTPATIENT
Start: 2023-11-07 | End: 2023-11-14

## 2023-11-07 NOTE — PROGRESS NOTES
Subjective:      Patient ID: Kwasi Arias is a 43 y.o. female.    Vitals:  vitals were not taken for this visit.     Chief Complaint: Eye Problem      Visit Type: TELE AUDIOVISUAL    Present with the patient at the time of consultation: TELEMED PRESENT WITH PATIENT: None    History reviewed. No pertinent past medical history.  Past Surgical History:   Procedure Laterality Date     SECTION      x2    TUBAL LIGATION       Review of patient's allergies indicates:  No Known Allergies  Current Outpatient Medications on File Prior to Visit   Medication Sig Dispense Refill    [DISCONTINUED] acetaminophen (TYLENOL) 500 MG tablet Take 2 tablets (1,000 mg total) by mouth every 6 (six) hours as needed for Pain (As needed for pain and fever). 30 tablet 0    [DISCONTINUED] buPROPion (WELLBUTRIN XL) 150 MG TB24 tablet Take 150 mg by mouth once daily.      [DISCONTINUED] fluticasone propionate (FLONASE) 50 mcg/actuation nasal spray 1 spray (50 mcg total) by Each Nostril route 2 (two) times daily. 16 g 0    [DISCONTINUED] ibuprofen (ADVIL,MOTRIN) 600 MG tablet Take 1 tablet (600 mg total) by mouth every 6 (six) hours as needed for Pain (Take with food as needed for mild-to-moderate pain). 20 tablet 0    [DISCONTINUED] LIDOcaine HCl 2% (XYLOCAINE) 2 % Soln by Mucous Membrane route every 3 (three) hours as needed. Apply 1 tsp to painful area every 3 hr as needed for pain 60 mL 0    [DISCONTINUED] loratadine (CLARITIN) 10 mg tablet Take 1 tablet (10 mg total) by mouth once daily. 60 tablet 0    [DISCONTINUED] oxyCODONE (ROXICODONE) 5 MG immediate release tablet Take 1 tablet (5 mg total) by mouth every 6 (six) hours as needed for Pain. 10 tablet 0     No current facility-administered medications on file prior to visit.     Family History   Problem Relation Age of Onset    No Known Problems Mother     Hypertension Father     No Known Problems Sister     No Known Problems Brother        Medications Ordered                 [id number] - [place] - [address] AT [facility] [address]   [address], [place] [address]    Telephone: [phone]   Fax: [phone]   Hours: Not open 24 hours                         E-Prescribed (1 of 1)              ciprofloxacin HCl (CILOXAN) 0.3 % ophthalmic solution    Sig: Place 1 drop into the left eye every 2 (two) hours. 1 drop every 2 hours while awake for 2 days, then every 4 hours for 5 days for 7 days       Start: 11/7/23     Quantity: 15 mL Refills: 0                           Ohs Peq Odvv Intake    11/7/2023  7:07 AM CST - Filed by Patient   Describe your reason for todays visit Pink eye   What is your current physical address in the event of a medical emergency? [id number] [place] [address]   Are you able to take your vital signs? No   Please attach any relevant images or files          Woke up with dry, itchy eyes and AM crusting. +Seasonal allergies. +Contact use.    Eye Problem   Associated symptoms include an eye discharge, eye redness and itching. Pertinent negatives include no blurred vision.       Eyes:  Positive for eye discharge, eye itching, eye pain and eye redness. Negative for foreign body in eye, blurred vision and eyelid swelling.   Allergic/Immunologic: Positive for seasonal allergies.        Objective:   The physical exam was conducted virtually.  Physical Exam   Constitutional: She is oriented to person, place, and time. She does not appear ill. No distress.   HENT:   Head: Normocephalic and atraumatic.   Nose: Nose normal.   Eyes: Extraocular movement intact   Pulmonary/Chest: Effort normal.   Abdominal: Normal appearance.   Musculoskeletal: Normal range of motion.         General: Normal range of motion.   Neurological: no focal deficit. She is alert and oriented to person, place, and time.   Psychiatric: Her behavior is normal. Mood normal.   Vitals reviewed.      Assessment:     1. Conjunctivitis, unspecified conjunctivitis  type, unspecified laterality        Plan:     Patient encouraged to monitor symptoms closely and instructed to follow-up for new or worsening symptoms. Further, in-person, evaluation may be necessary for continued treatment. Please follow up with your primary care doctor or specialist as needed. Verbally discussed plan. Patient confirms understanding and is in agreement with treatment and plan.     You must understand that you've received a Virtual Care evaluation only and that you may be released before all your medical problems are known or treated. You, the patient, will arrange for follow up care as instructed.    Conjunctivitis, unspecified conjunctivitis type, unspecified laterality    Other orders  -     ciprofloxacin HCl (CILOXAN) 0.3 % ophthalmic solution; Place 1 drop into the left eye every 2 (two) hours. 1 drop every 2 hours while awake for 2 days, then every 4 hours for 5 days for 7 days  Dispense: 15 mL; Refill: 0             Patient Instructions   Recommendations:     . Do not rub eyes. Wash hands frequently and disinfect common surfaces to avoid spread.     . Discontinue contact use until symptoms improve. Avoid eye make-up as this will likely contaminate your products.     . Warm or cool compresses may be soothing.     . Artificial tears to help lubricate and rinse the eye. Refrigerated drops may be soothing as well.     For allergic conjunctivitis: use antihistamines(Claritin, Zyrtec, Allegra), daily as directed, and allergy eye drops such as Pataday or Zaditor as directed.      Conjunctivitis (Pinkeye) Discharge Instructions   About this topic   The medical name for pink eye is conjunctivitis. Your eye is irritated or infected. It is red and irritated. Your eye may also itch, burn, or be sensitive to light. If an infection is causing your pink eye, it is easy to spread from person to person.  Infections are often caused by viruses and antibiotics wont help. Most of the time, pink eye will go away  on its own without treatment after a few days.  If the doctor thinks you have a bacterial infection in your eye, you will need antibiotics to treat the infection. It is important to take all of your antibiotics even if your eye starts to feel better.       What care is needed at home?   Ask your doctor what you need to do when you go home. Make sure you ask questions if you do not understand what the doctor says.  Wash your hands before touching your eyes. Gently remove your eye drainage or crusting with a clean cloth and warm water.  Avoid pollen if an allergy is causing your pink eye. Stay inside as much as you can with the windows closed during peak allergy seasons.  Lubricating eye drops or allergy eye drops may help your eye feel better. Make sure to read the directions carefully. Wash your hands with care before and after you touch your eye.  When you use eye drops, take care not to touch the bottle or dropper to your eye.  If you wear contact lenses, you will need to stop wearing them for a short time until your symptoms go away. If your contacts are disposable, start with fresh ones after your eye gets better. If not, clean your contacts with care. Clean your contact case thoroughly or get a new one.  Avoid sharing towels, washcloths, bedding, or personal items when you have pink eye.  You may need to stay out of work or school for a few days.  What follow-up care is needed?   Your doctor may ask you to make visits to the office to check on your progress. Be sure to keep these visits.  What drugs may be needed?   The doctor may order drugs based on the cause of your health problem. Talk to your doctor about what drugs you need to take.   Will physical activity be limited?   Your physical activities will not be limited. Remember, this infection spreads easily from person to person. Ask your doctor when you can go back to work or school.  What problems could happen?   You may be infected again from someone in  your house, workplace, or school.  What can be done to prevent this health problem?   Good hygiene can help prevent the spread of this infection.  Wash your hands well and often, after touching your face, and after you cough or sneeze.  Do not share eye make-up or eye drops with others.  Do not share pillows or towels with others.  Clean contact lenses daily. Do not sleep in them unless your eye doctor says it is OK.  When do I need to call the doctor?   You have trouble seeing clearly after blinking.  Your eye is still red or has drainage after 3 days.  You have eye pain that is getting worse.  Teach Back: Helping You Understand   The Teach Back Method helps you understand the information we are giving you. After you talk with the staff, tell them in your own words what you learned. This helps to make sure the staff has described each thing clearly. It also helps to explain things that may have been confusing. Before going home, make sure you can do these:  I can tell you about my condition.  I can tell you how to care for my eye.  I can tell you what I will do if I have eye pain or blurry eyesight.  Where can I learn more?   FamilyDoctor.org  http://familydoctor.org/familydoctor/en/diseases-conditions/allergic-conjunctivitis.html   Kids Health  http://kidshealth.org/en/parents/conjunctivitis.html?ref=search&WT.ac=jtc-m-zgzx-bb-pnctwq-gtz   NHS Choices  https://www.nhs.uk/conditions/conjunctivitis/   Last Reviewed Date   2021-06-10  Consumer Information Use and Disclaimer   This information is not specific medical advice and does not replace information you receive from your health care provider. This is only a brief summary of general information. It does NOT include all information about conditions, illnesses, injuries, tests, procedures, treatments, therapies, discharge instructions or life-style choices that may apply to you. You must talk with your health care provider for complete information about your  health and treatment options. This information should not be used to decide whether or not to accept your health care providers advice, instructions or recommendations. Only your health care provider has the knowledge and training to provide advice that is right for you.  Copyright   Copyright © 2021 [employer] and its affiliates and/or licensors. All rights reserved.

## 2023-11-07 NOTE — PATIENT INSTRUCTIONS
Recommendations:     . Do not rub eyes. Wash hands frequently and disinfect common surfaces to avoid spread.     . Discontinue contact use until symptoms improve. Avoid eye make-up as this will likely contaminate your products.     . Warm or cool compresses may be soothing.     . Artificial tears to help lubricate and rinse the eye. Refrigerated drops may be soothing as well.     For allergic conjunctivitis: use antihistamines(Claritin, Zyrtec, Allegra), daily as directed, and allergy eye drops such as Pataday or Zaditor as directed.      Conjunctivitis (Pinkeye) Discharge Instructions   About this topic   The medical name for pink eye is conjunctivitis. Your eye is irritated or infected. It is red and irritated. Your eye may also itch, burn, or be sensitive to light. If an infection is causing your pink eye, it is easy to spread from person to person.  Infections are often caused by viruses and antibiotics wont help. Most of the time, pink eye will go away on its own without treatment after a few days.  If the doctor thinks you have a bacterial infection in your eye, you will need antibiotics to treat the infection. It is important to take all of your antibiotics even if your eye starts to feel better.       What care is needed at home?   Ask your doctor what you need to do when you go home. Make sure you ask questions if you do not understand what the doctor says.  Wash your hands before touching your eyes. Gently remove your eye drainage or crusting with a clean cloth and warm water.  Avoid pollen if an allergy is causing your pink eye. Stay inside as much as you can with the windows closed during peak allergy seasons.  Lubricating eye drops or allergy eye drops may help your eye feel better. Make sure to read the directions carefully. Wash your hands with care before and after you touch your eye.  When you use eye drops, take care not to touch the bottle or dropper to your eye.  If you wear contact lenses, you  will need to stop wearing them for a short time until your symptoms go away. If your contacts are disposable, start with fresh ones after your eye gets better. If not, clean your contacts with care. Clean your contact case thoroughly or get a new one.  Avoid sharing towels, washcloths, bedding, or personal items when you have pink eye.  You may need to stay out of work or school for a few days.  What follow-up care is needed?   Your doctor may ask you to make visits to the office to check on your progress. Be sure to keep these visits.  What drugs may be needed?   The doctor may order drugs based on the cause of your health problem. Talk to your doctor about what drugs you need to take.   Will physical activity be limited?   Your physical activities will not be limited. Remember, this infection spreads easily from person to person. Ask your doctor when you can go back to work or school.  What problems could happen?   You may be infected again from someone in your house, workplace, or school.  What can be done to prevent this health problem?   Good hygiene can help prevent the spread of this infection.  Wash your hands well and often, after touching your face, and after you cough or sneeze.  Do not share eye make-up or eye drops with others.  Do not share pillows or towels with others.  Clean contact lenses daily. Do not sleep in them unless your eye doctor says it is OK.  When do I need to call the doctor?   You have trouble seeing clearly after blinking.  Your eye is still red or has drainage after 3 days.  You have eye pain that is getting worse.  Teach Back: Helping You Understand   The Teach Back Method helps you understand the information we are giving you. After you talk with the staff, tell them in your own words what you learned. This helps to make sure the staff has described each thing clearly. It also helps to explain things that may have been confusing. Before going home, make sure you can do these:  I  can tell you about my condition.  I can tell you how to care for my eye.  I can tell you what I will do if I have eye pain or blurry eyesight.  Where can I learn more?   FamilyDoctor.org  http://familydoctor.org/familydoctor/en/diseases-conditions/allergic-conjunctivitis.html   Kids Health  http://kidshealth.org/en/parents/conjunctivitis.html?ref=search&WT.ac=jnq-t-dicf-bq-bhkeiq-ans   NHS Choices  https://www.nhs.uk/conditions/conjunctivitis/   Last Reviewed Date   2021-06-10  Consumer Information Use and Disclaimer   This information is not specific medical advice and does not replace information you receive from your health care provider. This is only a brief summary of general information. It does NOT include all information about conditions, illnesses, injuries, tests, procedures, treatments, therapies, discharge instructions or life-style choices that may apply to you. You must talk with your health care provider for complete information about your health and treatment options. This information should not be used to decide whether or not to accept your health care providers advice, instructions or recommendations. Only your health care provider has the knowledge and training to provide advice that is right for you.  Copyright   Copyright © 2021 [employer] and its affiliates and/or licensors. All rights reserved.

## 2024-08-09 ENCOUNTER — ON-DEMAND VIRTUAL (OUTPATIENT)
Dept: URGENT CARE | Facility: CLINIC | Age: 44
End: 2024-08-09
Payer: MEDICAID

## 2024-08-09 DIAGNOSIS — J02.9 SORE THROAT: Primary | ICD-10-CM

## 2024-08-09 RX ORDER — PREDNISONE 20 MG/1
20 TABLET ORAL DAILY
Qty: 3 TABLET | Refills: 0 | Status: SHIPPED | OUTPATIENT
Start: 2024-08-09 | End: 2024-08-12

## 2025-07-12 ENCOUNTER — HOSPITAL ENCOUNTER (EMERGENCY)
Facility: HOSPITAL | Age: 45
Discharge: LEFT BEFORE DISCHARGE | End: 2025-07-12

## 2025-07-12 VITALS
RESPIRATION RATE: 18 BRPM | BODY MASS INDEX: 28.77 KG/M2 | WEIGHT: 179 LBS | DIASTOLIC BLOOD PRESSURE: 85 MMHG | HEIGHT: 66 IN | OXYGEN SATURATION: 100 % | TEMPERATURE: 98 F | HEART RATE: 75 BPM | SYSTOLIC BLOOD PRESSURE: 128 MMHG

## 2025-07-12 PROCEDURE — 99900041 HC LEFT WITHOUT BEING SEEN- EMERGENCY: Mod: ER
